# Patient Record
Sex: MALE | HISPANIC OR LATINO | Employment: FULL TIME | URBAN - METROPOLITAN AREA
[De-identification: names, ages, dates, MRNs, and addresses within clinical notes are randomized per-mention and may not be internally consistent; named-entity substitution may affect disease eponyms.]

---

## 2023-01-12 NOTE — TELEPHONE ENCOUNTER
RECORDS RECEIVED FROM: internal /ce   DATE RECEIVED: 2.20.23    NOTES (FOR ALL VISITS) STATUS DETAILS   OFFICE NOTES from referring provider internal  Self referred    OFFICE NOTES from other specialist ce - 11/8/22 Cusi   6.11.21 Lorenzoiestaiwo     MEDICATION LIST internal       LABS     DIABETES: HBGA1C, CREATININE, FASTING LIPIDS, MICROALBUMIN URINE, POTASSIUM, TSH, T4    THYROID: TSH, T4, CBC, THYRODLONULIN, TOTAL T3, FREE T4, CALCITONIN, CEA [facility]   CMP- 3.2.22  Lipid- 2.9.22  Cbc- 2.9.22  TSH- 2.9.22  Vitamin D-  2.9.22

## 2023-01-13 ENCOUNTER — HOSPITAL ENCOUNTER (OUTPATIENT)
Dept: GENERAL RADIOLOGY | Facility: CLINIC | Age: 38
Discharge: HOME | End: 2023-01-13
Attending: INTERNAL MEDICINE

## 2023-01-13 DIAGNOSIS — R76.11 MANTOUX: POSITIVE: ICD-10-CM

## 2023-01-13 PROCEDURE — 99207 XR CHEST 1 VIEW, EMPLOYEE HEALTH: CPT | Mod: GC | Performed by: RADIOLOGY

## 2023-01-13 PROCEDURE — 999N000028 XR CHEST 1 VIEW, EMPLOYEE HEALTH

## 2023-02-12 ENCOUNTER — HEALTH MAINTENANCE LETTER (OUTPATIENT)
Age: 38
End: 2023-02-12

## 2023-02-20 ENCOUNTER — PRE VISIT (OUTPATIENT)
Dept: ENDOCRINOLOGY | Facility: CLINIC | Age: 38
End: 2023-02-20

## 2023-02-21 ENCOUNTER — VIRTUAL VISIT (OUTPATIENT)
Dept: ENDOCRINOLOGY | Facility: CLINIC | Age: 38
End: 2023-02-21
Payer: COMMERCIAL

## 2023-02-21 DIAGNOSIS — E10.9 TYPE 1 DIABETES MELLITUS WITHOUT COMPLICATION (H): Primary | ICD-10-CM

## 2023-02-21 PROCEDURE — 99204 OFFICE O/P NEW MOD 45 MIN: CPT | Mod: VID | Performed by: STUDENT IN AN ORGANIZED HEALTH CARE EDUCATION/TRAINING PROGRAM

## 2023-02-21 RX ORDER — FOSINOPRIL SODIUM 20 MG/1
20 TABLET ORAL
COMMUNITY
Start: 2022-11-08 | End: 2023-05-25

## 2023-02-21 RX ORDER — ATORVASTATIN CALCIUM 10 MG/1
1 TABLET, FILM COATED ORAL
COMMUNITY
Start: 2022-11-08 | End: 2023-05-25

## 2023-02-21 RX ORDER — INSULIN LISPRO 100 [IU]/ML
INJECTION, SOLUTION INTRAVENOUS; SUBCUTANEOUS
COMMUNITY
Start: 2022-12-29

## 2023-02-21 NOTE — LETTER
2/21/2023       RE: Atul Walker  [id number]  [place] [address]     Dear Colleague,    Thank you for referring your patient, Atul Walker, to the [facility]OCRINOLOG[facility] [place] at [facility]. Please see a copy of my visit note below.    Endocrinology Clinic Visit 2/21/2023      Video-Visit Details    Type of service:  Video Visit    Joined the call at 2/21/2023, 4:11:31?pm.  Left the call at 2/21/2023, 4:43:05?pm.  You were on the call for 31 minutes 33 seconds .    Originating Location (pt. Location): Home        Distant Location (provider location):  Off-site    Mode of Communication:  Video Conference via EqsQuest    Physician has received verbal consent for a Video Visit from the patient? Yes    I spent a total of 45 minutes on the date of encounter reviewing medical records, evaluating the patient, coordinating care and documenting in the EHR, as detailed above.      NAME:  Atul Fernandez  PCP:  No primary care provider on file.  MRN:  [id number]  Reason for Consult:  DM1  Requesting Provider:  Referred Self    Chief Complaint     Chief Complaint   Patient presents with     Video Visit     Patient reports no question       History of Present Illness     Atul Fernandez is a 37 year old male who is seen in video visit for type 1 diabetes.    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 Extreme thirst and urination. Lost 30lbs in several months, non-healing mouth ulcers. -500s. A1c 13 at time of diagnosis. Positive antibodies per patient.    Has a Medtronic 770G pump. Rarely misses covering meals. Covers meals before eating. Still has BG spikes after meals, but usually not above 250.     Previous A1C in records reviewed. 7.2 on 2/9/22.    Diabetes Care/Complications:   Eyes: No  Last eye exam 1 year ago.  Kidneys: No, last urine  microalb negative  Nerves: No symptoms of neuropathy  Smoking: No  Blood Pressure: 120-30/70-80s at home.  Lipids: Yes, on statin  Macrovascular: No  Hypoglycemia: 1-2x/week. BG in 60s.    Previous DM related Hospitalization: No    Current treatment strategy:   Lantus 15u qPM if pump fails- has not had to use  Humalog via insulin pump    Blood Glucose Monitoring: we did not have his download on the time of the visit. He read the following:  Past 24h 83% in rage. Range is   17% above this range. No lows.      Diet:   Eats breakfast, lunch, and dinner.    Exercise: Walks    Social: No alcohol. Works as Neurologist. Started at [employer] first week of December.    Problem List     DM1    Medications     Current Outpatient Medications   Medication     atorvastatin (LIPITOR) 10 MG tablet     fosinopril (MONOPRIL) 20 MG tablet     HUMALOG 100 UNIT/ML injection     No current facility-administered medications for this visit.        Allergies     Allergies   Allergen Reactions     Nsaids Angioedema, Hives and Swelling     Eyes swelling       Shellfish Allergy Swelling       Medical / Surgical History     DM1    Social History     Social History     Socioeconomic History     Marital status:      Spouse name: Not on file     Number of children: Not on file     Years of education: Not on file     Highest education level: Not on file   Occupational History     Not on file   Tobacco Use     Smoking status: Never     Passive exposure: Never     Smokeless tobacco: Never   Substance and Sexual Activity     Alcohol use: Not on file     Drug use: Not on file     Sexual activity: Not on file   Other Topics Concern     Not on file   Social History Narrative     Not on file     Social Determinants of Health     Financial Resource Strain: Not on file   Food Insecurity: Not on file   Transportation Needs: Not on file   Physical Activity: Not on file   Stress: Not on file   Social Connections: Not on file   Intimate Partner  Violence: Not on file   Housing Stability: Not on file     Works as a Neurologist at the [employer]. Moved from [place] in 2022. Has 2 children, ages 5 and 7.  Smoking: No  Alcohol: No    Family History     No family history on file.   No family history of diabetes or autoimmune disease.  Dad's side: HTN, cardiac disease  Mom's side: Mom with hypothyroidism    ROS     12 ROS completed, pertinent positive and negative in HPI    Physical Exam   There were no vitals taken for this visit.   GENERAL: Healthy, alert and no distress  EYES: Eyes grossly normal to inspection.  No discharge or erythema, or obvious scleral/conjunctival abnormalities.  RESP: No audible wheeze, cough, or visible cyanosis.  No visible retractions or increased work of breathing.    SKIN: Visible skin clear. No significant rash, abnormal pigmentation or lesions.  NEURO: Cranial nerves grossly intact.  Mentation and speech appropriate for age.  PSYCH: Mentation appears normal, affect normal/bright, judgement and insight intact, normal speech and appearance well-groomed.     Labs/Imaging     Pertinent Labs were reviewed and discussed briefly.  Radiology Results were  reviewed.    Summary of recent findings:   3/2/22   Cr: 0.93    2/9/22  A1c: 7.2  Cholesterol: 144  Triglycerides: 34  LDL: 77  TSH: 1.6  Vit D: 19.55 (low)     I personally reviewed the patient's outside records from Care Everywhere. Summary of pertinent findings in [facility].    Impression / Plan     1. Latent autoimmune diabetes in adults (MATILDE)  36 y.o. year old male with type 1 diabetes mellitus, diagnosed in 2012. 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 He started to use a closed looped pump (Minimed Medtronic 670G) in August 2017. Now in 770G pump. No known current complications. A1c 7.2 in 2/2022.  Plan:   - Medtronic pump downloads were not available at time of visit. Will obtain these records.  - Labs: 25 Hydroxyvitamin D2 and D3,  Albumin, Cr, ALT, AST, CBC, A1c, Lipid profile, TTA IgA and IgG, TSH  - Referral to diabetes educator to discuss other pump options     2. Diabetes Complications: None     3. Blood Pressure Management: Blood pressure is 120-130s/70-80s at home. Currently is on pharmacotherapy for this.     4.Lipid Management: Per the new ACC/MAYRA/NHLBI guidelines, statins are recommended for individuals with diabetes aged 40-75 with LDL  without ASCVD, and for any individual with ASCVD. Currently the patient is on a statin.       5. Smoking Status: Patient is smoke free.    Review of the result(s) of each unique test - A1c and diabetes related labs.  45 minutes spent on the date of the encounter doing chart review, history and exam, documentation and further activities per the note       Test and/or medications prescribed today:  No orders of the defined types were placed in this encounter.        Follow up: 3 months with PA. 6 months with MD.    Nery Henry, MS4    I agree with the PFSH and ROS as completed by the Medical Student (MS).  The remainder of the encounter was performed by me and scribed by the MS.  The scribed note accurately reflects my personal services and the decisions made by me.    Juanpablo Rico MD  Endocrinology, Diabetes and Metabolism  [facility]      Juanpablo Rico MD  Endocrinology, Diabetes and Metabolism  [facility]    Answers for HPI/ROS submitted by the patient on 2/8/2023  General Symptoms: No  Skin Symptoms: No  HENT Symptoms: No  EYE SYMPTOMS: No  HEART SYMPTOMS: No  LUNG SYMPTOMS: No  INTESTINAL SYMPTOMS: No  URINARY SYMPTOMS: No  REPRODUCTIVE SYMPTOMS: No  SKELETAL SYMPTOMS: No  BLOOD SYMPTOMS: No  NERVOUS SYSTEM SYMPTOMS: No  MENTAL HEALTH SYMPTOMS: No        error      Again, thank you for allowing me to participate in the care of your patient.      Sincerely,    Juanpablo Rico MD

## 2023-02-21 NOTE — PROGRESS NOTES
Endocrinology Clinic Visit 2/21/2023      Video-Visit Details    Type of service:  Video Visit    Joined the call at 2/21/2023, 4:11:31?pm.  Left the call at 2/21/2023, 4:43:05?pm.  You were on the call for 31 minutes 33 seconds .    Originating Location (pt. Location): Home        Distant Location (provider location):  Off-site    Mode of Communication:  Video Conference via Deenty    Physician has received verbal consent for a Video Visit from the patient? Yes    I spent a total of 45 minutes on the date of encounter reviewing medical records, evaluating the patient, coordinating care and documenting in the EHR, as detailed above.      NAME:  Atul Fernandez  PCP:  No primary care provider on file.  MRN:  [id number]  Reason for Consult:  DM1  Requesting Provider:  Referred Self    Chief Complaint     Chief Complaint   Patient presents with     Video Visit     Patient reports no question       History of Present Illness     Atul Fernandez is a 37 year old male who is seen in video visit for type 1 diabetes.    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 Extreme thirst and urination. Lost 30lbs in several months, non-healing mouth ulcers. -500s. A1c 13 at time of diagnosis. Positive antibodies per patient.    Has a Medtronic 770G pump. Rarely misses covering meals. Covers meals before eating. Still has BG spikes after meals, but usually not above 250.     Previous A1C in records reviewed. 7.2 on 2/9/22.    Diabetes Care/Complications:   Eyes: No DR. Last eye exam 1 year ago.  Kidneys: No, last urine microalb negative  Nerves: No symptoms of neuropathy  Smoking: No  Blood Pressure: 120-30/70-80s at home.  Lipids: Yes, on statin  Macrovascular: No  Hypoglycemia: 1-2x/week. BG in 60s.    Previous DM related Hospitalization: No    Current treatment strategy:   Lantus 15u qPM if pump fails- has not had to use  Humalog via insulin pump    Blood Glucose  Monitoring: we did not have his download on the time of the visit. He read the following:  Past 24h 83% in rage. Range is   17% above this range. No lows.      Diet:   Eats breakfast, lunch, and dinner.    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    Problem List     DM1    Medications     Current Outpatient Medications   Medication     atorvastatin (LIPITOR) 10 MG tablet     fosinopril (MONOPRIL) 20 MG tablet     HUMALOG 100 UNIT/ML injection     No current facility-administered medications for this visit.        Allergies     Allergies   Allergen Reactions     Nsaids Angioedema, Hives and Swelling     Eyes swelling       Shellfish Allergy Swelling       Medical / Surgical History     DM1    Social History     Social History     Socioeconomic History     Marital status:      Spouse name: Not on file     Number of children: Not on file     Years of education: Not on file     Highest education level: Not on file   Occupational History     Not on file   Tobacco Use     Smoking status: Never     Passive exposure: Never     Smokeless tobacco: Never   Substance and Sexual Activity     Alcohol use: Not on file     Drug use: Not on file     Sexual activity: Not on file   Other Topics Concern     Not on file   Social History Narrative     Not on file     Social Determinants of Health     Financial Resource Strain: Not on file   Food Insecurity: Not on file   Transportation Needs: Not on file   Physical Activity: Not on file   Stress: Not on file   Social Connections: Not on file   Intimate Partner Violence: Not on file   Housing Stability: Not on file     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 Moved from [place] in 2022. Has 2 children, ages 5 and 7.  Smoking: No  Alcohol: No    Family History     No family history on file.   No family history of diabetes or autoimmune disease.  Dad's side: HTN, cardiac disease  Mom's side: Mom with  hypothyroidism    ROS     12 ROS completed, pertinent positive and negative in HPI    Physical Exam   There were no vitals taken for this visit.   GENERAL: Healthy, alert and no distress  EYES: Eyes grossly normal to inspection.  No discharge or erythema, or obvious scleral/conjunctival abnormalities.  RESP: No audible wheeze, cough, or visible cyanosis.  No visible retractions or increased work of breathing.    SKIN: Visible skin clear. No significant rash, abnormal pigmentation or lesions.  NEURO: Cranial nerves grossly intact.  Mentation and speech appropriate for age.  PSYCH: Mentation appears normal, affect normal/bright, judgement and insight intact, normal speech and appearance well-groomed.     Labs/Imaging     Pertinent Labs were reviewed and discussed briefly.  Radiology Results were  reviewed.    Summary of recent findings:   3/2/22   Cr: 0.93    2/9/22  A1c: 7.2  Cholesterol: 144  Triglycerides: 34  LDL: 77  TSH: 1.6  Vit D: 19.55 (low)     I personally reviewed the patient's outside records from Care Everywhere. Summary of pertinent findings in HPI.    Impression / Plan     1. Latent autoimmune diabetes in adults (MATILDE)  36 y.o. year old male with type 1 diabetes mellitus, diagnosed in 2012. 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 He started to use a closed looped pump (Minimed Medtronic 670G) in August 2017. Now in 770G pump. No known current complications. A1c 7.2 in 2/2022.  Plan:   - Medtronic pump downloads were not available at time of visit. Will obtain these records.  - Labs: 25 Hydroxyvitamin D2 and D3, Albumin, Cr, ALT, AST, CBC, A1c, Lipid profile, TTA IgA and IgG, TSH  - Referral to diabetes educator to discuss other pump options     2. Diabetes Complications: None     3. Blood Pressure Management: Blood pressure is 120-130s/70-80s at home. Currently is on pharmacotherapy for this.     4.Lipid Management: Per the new ACC/MAYRA/NHLBI guidelines,  statins are recommended for individuals with diabetes aged 40-75 with LDL  without ASCVD, and for any individual with ASCVD. Currently the patient is on a statin.       5. Smoking Status: Patient is smoke free.    Review of the result(s) of each unique test - A1c and diabetes related labs.  45 minutes spent on the date of the encounter doing chart review, history and exam, documentation and further activities per the note       Test and/or medications prescribed today:  No orders of the defined types were placed in this encounter.        Follow up: 3 months with PA. 6 months with MD. Nery Henry, MS4    I agree with the PFSH and ROS as completed by the Medical Student (MS).  The remainder of the encounter was performed by me and scribed by the MS.  The scribed note accurately reflects my personal services and the decisions made by me.    Juanpablo Rico MD  Endocrinology, Diabetes and Metabolism  [facility]      Juanpablo Rico MD  Endocrinology, Diabetes and Metabolism  [facility]    Answers for HPI/ROS submitted by the patient on 2/8/2023  General Symptoms: No  Skin Symptoms: No  HENT Symptoms: No  EYE SYMPTOMS: No  HEART SYMPTOMS: No  LUNG SYMPTOMS: No  INTESTINAL SYMPTOMS: No  URINARY SYMPTOMS: No  REPRODUCTIVE SYMPTOMS: No  SKELETAL SYMPTOMS: No  BLOOD SYMPTOMS: No  NERVOUS SYSTEM SYMPTOMS: No  MENTAL HEALTH SYMPTOMS: No

## 2023-02-21 NOTE — NURSING NOTE
Is the patient currently in the state of [place]? YES    Visit mode:VIDEO    If the visit is dropped, the patient can be reconnected by: VIDEO VISIT: Text to cell phone: [phone]    Will anyone else be joining the visit? NO      How would you like to obtain your AVS? Mail a copy    Are changes needed to the allergy or medication list? YES: Medications and allergies abstracted through patient reconciliation per patient request. Dosages verified with patient. Patient also notes using CGM with medtronic insulin pump.    Reason for visit: New Patient

## 2023-02-21 NOTE — LETTER
Date:February 24, 2023      Patient was self referred, no letter generated. Do not send.        [facility] Health Information

## 2023-02-27 DIAGNOSIS — E10.9 TYPE 1 DIABETES MELLITUS WITHOUT COMPLICATION (H): Primary | ICD-10-CM

## 2023-03-02 RX ORDER — INSULIN ASPART 100 [IU]/ML
INJECTION, SOLUTION INTRAVENOUS; SUBCUTANEOUS
Qty: 70 ML | Refills: 3 | Status: SHIPPED | OUTPATIENT
Start: 2023-03-02 | End: 2024-07-03

## 2023-03-08 ENCOUNTER — TELEPHONE (OUTPATIENT)
Dept: ENDOCRINOLOGY | Facility: CLINIC | Age: 38
End: 2023-03-08
Payer: COMMERCIAL

## 2023-03-08 NOTE — TELEPHONE ENCOUNTER
VIVIANA and sent MyChart for pt to c/back to schedule a 3 month f/up with a PA and a 6 month f/up with Dr. Rico per the checkout orders from visit on 2/21/2023.     Kelsey Love on 3/8/2023 at 4:01 PM

## 2023-03-11 NOTE — TELEPHONE ENCOUNTER
VIVIANA and sent [facility] for a 3 month follow up with a PA and a 6 month follow up with Dr. Rico.  Afua Altman, CMA

## 2023-03-15 ENCOUNTER — VIRTUAL VISIT (OUTPATIENT)
Dept: EDUCATION SERVICES | Facility: CLINIC | Age: 38
End: 2023-03-15
Attending: STUDENT IN AN ORGANIZED HEALTH CARE EDUCATION/TRAINING PROGRAM
Payer: COMMERCIAL

## 2023-03-15 DIAGNOSIS — E10.9 TYPE 1 DIABETES MELLITUS WITHOUT COMPLICATION (H): ICD-10-CM

## 2023-03-15 PROCEDURE — G0108 DIAB MANAGE TRN  PER INDIV: HCPCS | Mod: VID

## 2023-03-15 RX ORDER — INSULIN PMP CART,AUT,G6/7,CNTR
1 EACH SUBCUTANEOUS
Qty: 1 KIT | Refills: 0 | Status: SHIPPED | OUTPATIENT
Start: 2023-03-15

## 2023-03-15 RX ORDER — PROCHLORPERAZINE 25 MG/1
SUPPOSITORY RECTAL
Qty: 1 EACH | Refills: 1 | Status: SHIPPED | OUTPATIENT
Start: 2023-03-15 | End: 2023-11-27

## 2023-03-15 RX ORDER — PROCHLORPERAZINE 25 MG/1
SUPPOSITORY RECTAL
Qty: 3 EACH | Refills: 5 | Status: SHIPPED | OUTPATIENT
Start: 2023-03-15 | End: 2023-10-25

## 2023-03-15 RX ORDER — INSULIN PMP CART,AUT,G6/7,CNTR
1 EACH SUBCUTANEOUS
Qty: 15 EACH | Refills: 11 | Status: SHIPPED | OUTPATIENT
Start: 2023-03-15 | End: 2023-10-25

## 2023-03-15 NOTE — PROGRESS NOTES
Video-Visit Details    Type of service:  Video Visit  Patient consented to video visit  Video Start Time:  8a  Video End Time:   9a  Originating Location (pt. 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     PATIENT CONCERNS RELATED TO DIABETES SELF MANAGEMENT:  Estb care and discuss pumps      ASSESSMENT:    Taking Medication:     Current Diabetes Management per Patient:  Medtronic 770 G Pump Humalog      Monitoring    Patient glucose self monitoring as follows:   [employer]             Patient's most recent No results found for: A1C   Patient's A1C goal: <7.0    Activity: sporadic or irregular exercise usually exercises just moved from [place]    Healthy Eating:   Patient currently eats 3 meals 0 snacks per day     Breakfast  615-630a 2 toasts turkey and cheese and coffee  Lunch   Rice and beans and protein and salad water  Dinner 7-8pm Similar to lunch excepts Friday pizza water    Occ snacks on protein bar  No alcohol     Problem Solving:      Patient is at risk of hypoglycemia?: YES and Frequency is one to two times per week  Hospitalizations for hyper or hypoglycemia: No    Healthy Coping and Stress Management:   Sources of stress identified by patient My Job and the  of neurology   Coping mechanisms identified by patient:  Other (please specify):  relaxtion with 2 girls         EDUCATION and INSTRUCTION PROVIDED AT THIS VISIT:     Pt had a 1x1 visit to discuss insulin pumps. 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 Pt was dx with  Type 1 DM in 2012. He is currently on Medtronic 770G pump.  His time in range is currently 73% and 1% lows and he is in automode 95% of the time. Pt is getting blood sugars spike most days after breakfast. Today we added a I:C ratio of 1 unit per 13 grams of carb from 6a-8a.  We reviewed Omnipod 5 and Tandem with control IQ the advantages and disadvantages. Pt does get frustrated with all the calibrations with the Medtronic pump. He is interested in trying the Dexcom G6 with the Omnipod 5.  RX's sent to [facility].  Pt will contact CDE if we plans to proceed with pump based on cost he does have about 9mo of Medtronic supplies. Pt aware he will need a pump start appt with SHELBY or Omnipod traininer.    Patient-stated goal written and given to Atul Walker.  Verbalized and demonstrated understanding of instructions.     PLAN:    RX's sent to [facility] for Dexcom G6 and Omnipod 5 pump    Contact CLARE Zhang/SHELBY if you decide to proceed with starting Dexcom and pump so we can get a pump start appt with me or Omnipod     Start Dexcom on own (youtube videos are great). Start it prior to your pump start appt    Reach out with any questions    Leatha WILSON, RN, [facility]  Certified Diabetes Care and   [facility]  [address] [place] [address]  Phone [phone]      Time spent with patient at today's visit was 60 minutes.      Any diabetes medication dose changes were made via the SHELBY Protocol and Collaborative Practice Agreement with [place] and  [facility].  A copy of this encounter was provided to patient's referring provider.

## 2023-05-03 ENCOUNTER — LAB (OUTPATIENT)
Dept: LAB | Facility: CLINIC | Age: 38
End: 2023-05-03
Payer: COMMERCIAL

## 2023-05-03 DIAGNOSIS — E10.9 TYPE 1 DIABETES MELLITUS WITHOUT COMPLICATION (H): ICD-10-CM

## 2023-05-03 LAB
ALT SERPL W P-5'-P-CCNC: 65 U/L (ref 10–50)
AST SERPL W P-5'-P-CCNC: 28 U/L (ref 10–50)
CHOLEST SERPL-MCNC: 150 MG/DL
CREAT SERPL-MCNC: 0.96 MG/DL (ref 0.67–1.17)
CREAT UR-MCNC: 161 MG/DL
ERYTHROCYTE [DISTWIDTH] IN BLOOD BY AUTOMATED COUNT: 12.8 % (ref 10–15)
FASTING STATUS PATIENT QL REPORTED: YES
GFR SERPL CREATININE-BSD FRML MDRD: >90 ML/MIN/1.73M2
GLUCOSE SERPL-MCNC: 126 MG/DL (ref 70–99)
HBA1C MFR BLD: 7.7 % (ref 0–5.6)
HCT VFR BLD AUTO: 45.7 % (ref 40–53)
HDLC SERPL-MCNC: 57 MG/DL
HGB BLD-MCNC: 15.2 G/DL (ref 13.3–17.7)
LDLC SERPL CALC-MCNC: 85 MG/DL
MCH RBC QN AUTO: 28.8 PG (ref 26.5–33)
MCHC RBC AUTO-ENTMCNC: 33.3 G/DL (ref 31.5–36.5)
MCV RBC AUTO: 87 FL (ref 78–100)
MICROALBUMIN UR-MCNC: <12 MG/L
MICROALBUMIN/CREAT UR: NORMAL MG/G{CREAT}
NONHDLC SERPL-MCNC: 93 MG/DL
PLATELET # BLD AUTO: 256 10E3/UL (ref 150–450)
RBC # BLD AUTO: 5.27 10E6/UL (ref 4.4–5.9)
TRIGL SERPL-MCNC: 40 MG/DL
TSH SERPL DL<=0.005 MIU/L-ACNC: 2.35 UIU/ML (ref 0.3–4.2)
WBC # BLD AUTO: 6.7 10E3/UL (ref 4–11)

## 2023-05-03 PROCEDURE — 82947 ASSAY GLUCOSE BLOOD QUANT: CPT

## 2023-05-03 PROCEDURE — 84460 ALANINE AMINO (ALT) (SGPT): CPT

## 2023-05-03 PROCEDURE — 86364 TISS TRNSGLTMNASE EA IG CLAS: CPT

## 2023-05-03 PROCEDURE — 83036 HEMOGLOBIN GLYCOSYLATED A1C: CPT

## 2023-05-03 PROCEDURE — 85027 COMPLETE CBC AUTOMATED: CPT

## 2023-05-03 PROCEDURE — 80061 LIPID PANEL: CPT

## 2023-05-03 PROCEDURE — 82043 UR ALBUMIN QUANTITATIVE: CPT

## 2023-05-03 PROCEDURE — 82570 ASSAY OF URINE CREATININE: CPT

## 2023-05-03 PROCEDURE — 82306 VITAMIN D 25 HYDROXY: CPT

## 2023-05-03 PROCEDURE — 84443 ASSAY THYROID STIM HORMONE: CPT

## 2023-05-03 PROCEDURE — 36415 COLL VENOUS BLD VENIPUNCTURE: CPT

## 2023-05-03 PROCEDURE — 82565 ASSAY OF CREATININE: CPT

## 2023-05-03 PROCEDURE — 84450 TRANSFERASE (AST) (SGOT): CPT

## 2023-05-04 LAB
TTG IGA SER-ACNC: 0.8 U/ML
TTG IGG SER-ACNC: <0.6 U/ML

## 2023-05-07 LAB
DEPRECATED CALCIDIOL+CALCIFEROL SERPL-MC: <24 UG/L (ref 20–75)
VITAMIN D2 SERPL-MCNC: <5 UG/L
VITAMIN D3 SERPL-MCNC: 19 UG/L

## 2023-05-25 ENCOUNTER — TELEPHONE (OUTPATIENT)
Dept: ENDOCRINOLOGY | Facility: CLINIC | Age: 38
End: 2023-05-25
Payer: COMMERCIAL

## 2023-05-25 ENCOUNTER — MYC REFILL (OUTPATIENT)
Dept: EDUCATION SERVICES | Facility: CLINIC | Age: 38
End: 2023-05-25
Payer: COMMERCIAL

## 2023-05-25 DIAGNOSIS — E10.9 TYPE 1 DIABETES MELLITUS WITHOUT COMPLICATION (H): Primary | ICD-10-CM

## 2023-05-25 RX ORDER — FOSINOPRIL SODIUM 20 MG/1
20 TABLET ORAL DAILY
Qty: 90 TABLET | Refills: 3 | Status: SHIPPED | OUTPATIENT
Start: 2023-05-25 | End: 2024-05-31

## 2023-05-25 RX ORDER — ATORVASTATIN CALCIUM 10 MG/1
10 TABLET, FILM COATED ORAL
Qty: 90 TABLET | Refills: 3 | Status: SHIPPED | OUTPATIENT
Start: 2023-05-25 | End: 2024-05-31

## 2023-05-25 NOTE — TELEPHONE ENCOUNTER
Called pt to schedule appt they thought was scheduled for July, but there is no record of it being scheduled  Yovany Nuno on 5/25/2023 at 9:03 AM

## 2023-08-13 ENCOUNTER — HEALTH MAINTENANCE LETTER (OUTPATIENT)
Age: 38
End: 2023-08-13

## 2023-08-21 ENCOUNTER — MEDICAL CORRESPONDENCE (OUTPATIENT)
Dept: HEALTH INFORMATION MANAGEMENT | Facility: CLINIC | Age: 38
End: 2023-08-21

## 2023-08-22 ENCOUNTER — TELEPHONE (OUTPATIENT)
Dept: ENDOCRINOLOGY | Facility: CLINIC | Age: 38
End: 2023-08-22
Payer: COMMERCIAL

## 2023-08-22 NOTE — TELEPHONE ENCOUNTER
Prior Authorization Retail Medication Request    Medication/Dose: Omnipod 5 G6 1 every 72 hours     ICD code (if different than what is on RX):  E10.9    Previously Tried and Failed:    Rationale:  Type 1 diabetes mellitus without complication     Insurance Name:    Insurance ID:        Pharmacy Information (if different than what is on RX)  Name:    Phone:

## 2023-08-25 NOTE — TELEPHONE ENCOUNTER
PA Initiation    Medication: OMNIPOD 5 G6 POD (GEN 5) MISC  Insurance Company: [employer] - Phone [phone] Fax [phone]  Pharmacy Filling the Rx: [id number] - [place], [place] - [address]  Filling Pharmacy Phone: [phone]  Filling Pharmacy Fax: [phone]  Start Date: 8/24/2023

## 2023-08-25 NOTE — TELEPHONE ENCOUNTER
Prior Authorization Approval    Authorization Effective Date: 8/25/2023  Authorization Expiration Date: 8/25/2024  Medication: Insulin Disposable Pump (OMNIPOD 5 G6 POD, GEN 5,) MISC  Approved Dose/Quantity:   Reference #:     Insurance Company: mention - Phone [phone] Fax [phone]  Expected CoPay:       CoPay Card Available:      Foundation Assistance Needed:    Which Pharmacy is filling the prescription (Not needed for infusion/clinic administered): [id number] - [place], [place] - [address]  Pharmacy Notified: Yes  Patient Notified: Yes

## 2023-09-18 NOTE — PROGRESS NOTES
Outcome for 09/18/23 4:19 PM: Data uploaded on Carelink  Bailee Bradshaw LPN   Outcome for 09/22/23 11:20 AM: frestylt message sent- pt has switched to dexcom and omnipod  Tova Gonzalez MA  Outcome for 09/22/23 11:23 AM: Left Voicemail   Tova Gonzalez MA  Outcome for 09/25/23 2:34 PM: Data obtained via Dexcom and Glooko website  Bailee Bradshaw LPN

## 2023-09-20 ENCOUNTER — VIRTUAL VISIT (OUTPATIENT)
Dept: EDUCATION SERVICES | Facility: CLINIC | Age: 38
End: 2023-09-20
Payer: COMMERCIAL

## 2023-09-20 VITALS — WEIGHT: 165.34 LBS

## 2023-09-20 DIAGNOSIS — E10.9 TYPE 1 DIABETES MELLITUS WITHOUT COMPLICATION (H): Primary | ICD-10-CM

## 2023-09-20 PROCEDURE — 99207 PR NO BILLABLE SERVICE THIS VISIT: CPT | Mod: VID

## 2023-09-20 NOTE — PROGRESS NOTES
Virtual Visit Details    Type of service:  Video Visit-converted to phone    Originating Location (pt. Location): Home    Distant Location (provider location):  On-site  Platform used for Video Visit: [facility]    Diabetes Type 1:  Atul Walker has been using a Medtronic 770 pump and transitioned to OP5 insulin pump. He self started 9/9. He does use a continuous glucose monitor (CGM).    Atul Walker uses Novolog in his insulin pump. Unfortunately, I am unable to view pump reports. Based on Dexcom ICR likely needs strengthening. He is going to connect with Joyce Magallanes to troubleshoot linking data and will let me know when completed. Visit to be rescheduled once data is available.    Time spent in visit: 18 minutes-NO CHARGE    Steffany Quinones RN, Diabetes Educator  Diabetes Education Department  [facility], [place]  [phone]

## 2023-09-22 ENCOUNTER — TELEPHONE (OUTPATIENT)
Dept: ENDOCRINOLOGY | Facility: CLINIC | Age: 38
End: 2023-09-22

## 2023-09-22 ENCOUNTER — VIRTUAL VISIT (OUTPATIENT)
Dept: EDUCATION SERVICES | Facility: CLINIC | Age: 38
End: 2023-09-22
Payer: COMMERCIAL

## 2023-09-22 DIAGNOSIS — E10.9 TYPE 1 DIABETES MELLITUS WITHOUT COMPLICATION (H): Primary | ICD-10-CM

## 2023-09-22 PROCEDURE — 98967 PH1 ASSMT&MGMT NQHP 11-20: CPT | Mod: 93

## 2023-09-22 RX ORDER — URINE ACETONE TEST STRIPS
STRIP MISCELLANEOUS
Qty: 50 STRIP | Refills: 1 | Status: SHIPPED | OUTPATIENT
Start: 2023-09-22

## 2023-09-22 ASSESSMENT — PAIN SCALES - GENERAL: PAINLEVEL: NO PAIN (0)

## 2023-09-22 NOTE — PATIENT INSTRUCTIONS
PLAN:  Change carb ratio at 6am to 12  Change carb ratio at 9am to 13  Change active insulin time to 3 hours  Change correct above from 140 to 130  Follow up 9/26 with Dr. Rico

## 2023-09-22 NOTE — TELEPHONE ENCOUNTER
Called patient and left voicemail. Patient has an appointment on  9/26/23 . Need patient to upload their Dexcom and omnipod devices to site for provider to review prior to their appointment.  Tova Gonzalez MA

## 2023-09-22 NOTE — PROGRESS NOTES
Diabetes Self-Management Education & Support Telephone Visit  Diabetes Type 1:  Atul Walker has been using an OP5 insulin pump since . He self started from Medtronic to 770. He does use a continuous glucose monitor (CGM).    Atul Walker uses Novolog in his insulin pump.    Other diabetes medications patient is taking include: Yes.  Patient is not taking a daily asprin.  Patient is taking a statin.  Patient is taking an ACE/ARB.    Patient reports has reported hypoglycemic episodes.  Hypoglycemic symptoms consist of jittery.  Patient will usually treat lows with glucose tablets.  Patient does have Baqsimi available at home and it is not .  Family has been instructed on how to use in a hypoglycemic emergency.    Patient does not report recent hyperglycemia symptoms, including: sluggish near 300. Patient does follow insulin pump high blood sugar protocol and does not have ketone testing strips available    Patient is up to date with their yearly eye exam.  Patient is up to date with their yearly foot exam.    Insulin pump download reveals:  Patient is changing infusion set an average of every 3 days.  Patient has an average of 5.1 manual boluses per day and 0 overrides per day.  Average suspend duration is 0 minutes/day.  Patient's average blood sugar is 173 +/- 60 mg/dL  Percentage of readings above target: 29%  Percentage of readings below target: 0%  Average daily carb intake: 229.6   Patient's TDD is 50.3 units.  Patient's basal/bolus ratio is 70%/30%, 35.2/15.1 units    Discussion of behaviors related to insulin pump use reveals:Pre-meal boluses, calibrates with steady arrow    Assessment:  Transitioned from Medtronic 770 to OP5 on . Overall, feels things are going well. Pattern of prolonged post prandial highs. Will strengthen ICR at 6am to 12 and 9am to 13. Change active insulin time to 3 hours and correct above to 130.    PLAN:  Change carb ratio at 6am to 12  Change carb  ratio at 9am to 13  Change active insulin time to 3 hours  Change correct above from 140 to 130  Follow up 9/26 with Dr. Rico    See scanned in pump report(s) data below:        Time spent in visit: 18 minutes-NO CHARGE.    Steffany Quinones RN, Diabetes Educator  Diabetes Education Department  [facility], [place]  [phone]

## 2023-09-22 NOTE — NURSING NOTE
Is the patient currently in the state of [place]? YES    Visit mode:VIDEO    If the visit is dropped, the patient can be reconnected by: VIDEO VISIT: Send to e-mail at: [email or web address]    Will anyone else be joining the visit? NO  (If patient encounters technical issues they should call [phone])    How would you like to obtain your AVS? MyChart    Are changes needed to the allergy or medication list? No  Please remove any meds marked not taking and any flagged for removal.    Reason for visit: No chief complaint on file.    Wt/ht other than 24 hrs:  none given  Pain more than one location:  no  Xiao STRICKLANDF

## 2023-09-26 ENCOUNTER — VIRTUAL VISIT (OUTPATIENT)
Dept: ENDOCRINOLOGY | Facility: CLINIC | Age: 38
End: 2023-09-26
Payer: COMMERCIAL

## 2023-09-26 DIAGNOSIS — E10.9 TYPE 1 DIABETES MELLITUS WITHOUT COMPLICATION (H): Primary | ICD-10-CM

## 2023-09-26 PROBLEM — E11.9 DIABETES MELLITUS, TYPE 2 (H): Status: ACTIVE | Noted: 2023-09-26

## 2023-09-26 PROCEDURE — 99215 OFFICE O/P EST HI 40 MIN: CPT | Mod: VID | Performed by: STUDENT IN AN ORGANIZED HEALTH CARE EDUCATION/TRAINING PROGRAM

## 2023-09-26 NOTE — NURSING NOTE
Is the patient currently in the state of [place]? YES    Visit mode:VIDEO    If the visit is dropped, the patient can be reconnected by: VIDEO VISIT: Text to cell phone:   Telephone Information:   Mobile [phone]       Will anyone else be joining the visit? NO  (If patient encounters technical issues they should call [phone])    How would you like to obtain your AVS? MyChart    Are changes needed to the allergy or medication list? Pt stated no changes to allergies and Pt stated no med changes    Reason for visit: IESHA Gonzalez, SHIRLEY, VVF

## 2023-09-26 NOTE — LETTER
9/26/2023       RE: Atul Walker  [address] [address]     Dear Colleague,    Thank you for referring your patient, Atul Walker, to the [facility] ENDOCRINOLOG[facility] [place] at [facility]. Please see a copy of my visit note below.    Outcome for 09/18/23 4:19 PM: Data uploaded on [employer] [facility]  Bailee Bradshaw LPN   Outcome for 09/22/23 11:20 AM: DWNLDt message sent- pt has switched to dexcom and omnipod  Tova Gonzalez MA  Outcome for 09/22/23 11:23 AM: Left Voicemail   Tova Gonzalez MA  Outcome for 09/25/23 2:34 PM: Data obtained via Dexcom and [employer] website  Bailee Bradshaw LPN                         Endocrinology Clinic Visit       Video-Visit Details    Type of service:  Video Visit    Joined the call at 9/26/2023, 1:37:04 pm.  Left the call at 9/26/2023, 2:00:46 pm.    Originating Location (pt. Location): Home        Distant Location (provider location):  Off-site    Mode of Communication:  Video Conference via [facility]    Physician has received verbal consent for a Video Visit from the patient? Yes    I spent a total of 40 minutes on the date of encounter reviewing medical records, evaluating the patient, coordinating care and documenting in the EHR, as detailed above.      NAME:  Atul Leeroy isadora Menchaca  PCP:  No primary care provider on file.  MRN:  [id number]  Reason for Consult:  DM1  Requesting Provider:  Referred Self    Chief Complaint     Chief Complaint   Patient presents with    RECHECK       History of Present Illness     Atul Leeroy Brewerda is a 37 year old male who is seen in video visit for type 1 diabetes. He established with me on 2/2023.     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 A1c 13 at time of diagnosis. Positive antibodies per patient. 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 He started to use a closed looped pump  (Minimed Medtronic 670G) in August 2017. then 770G pump.     Last visit he had a Medtronic 770G pump. Rarely misses covering meals. Covers meals before eating. Still has BG spikes after meals. He met with keisha Steve on 3/2023.    Interval hx: he switched to omnipod 2 weeks ago. He is happy with his new pump. He saw rl last week and his ICR was slightly increase at lunch and dinner from 1:14 to 1:13.    Previous A1C in records reviewed. Last A1c on 5/2023 was 7.7    Diabetes Care/Complications:   Eyes: No DR. Last eye exam 1 year ago.  Kidneys: No, last urine microalb negative on 5/2023  Nerves: No symptoms of neuropathy  Smoking: No  Blood Pressure: 120-30/70-80s at home.  Lipids: Yes, on statin, last LDL 85 on 5/2023  Macrovascular: No  Hypoglycemia:no concern    Previous DM related Hospitalization: No    Current treatment strategy:   Lantus 15u qPM if pump fails- has not had to use  Novolog via insulin pump    Blood Glucose Monitoring:                             Diet:   Eats breakfast, lunch, and dinner.    Exercise: Walks    Social: No alcohol. 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    Problem List     DM1    Medications     Current Outpatient Medications   Medication    atorvastatin (LIPITOR) 10 MG tablet    Continuous Blood Gluc Sensor (DEXCOM G6 SENSOR) MISC    Continuous Blood Gluc Transmit (DEXCOM G6 TRANSMITTER) MISC    fosinopril (MONOPRIL) 20 MG tablet    Glucagon (BAQSIMI) 3 MG/DOSE nasal powder    HUMALOG 100 UNIT/ML injection    insulin aspart (NOVOLOG VIAL) 100 UNITS/ML vial    Insulin Disposable Pump (OMNIPOD 5 G6 INTRO, GEN 5,) KIT    Insulin Disposable Pump (OMNIPOD 5 G6 POD, GEN 5,) MISC    KETOSTIX test strip     No current facility-administered medications for this visit.        Allergies     Allergies   Allergen Reactions    Nsaids Angioedema, Hives and Swelling     Eyes swelling      Shellfish Allergy Swelling       Medical / Surgical History     DM1    Social  History     Social History     Socioeconomic History    Marital status:      Spouse name: Not on file    Number of children: Not on file    Years of education: Not on file    Highest education level: Not on file   Occupational History    Not on file   Tobacco Use    Smoking status: Never     Passive exposure: Never    Smokeless tobacco: Never   Vaping Use    Vaping Use: Never used   Substance and Sexual Activity    Alcohol use: Not on file    Drug use: Not on file    Sexual activity: Not on file   Other Topics Concern    Not on file   Social History Narrative    Not on file     Social Determinants of Health     Financial Resource Strain: Not on file   Food Insecurity: Not on file   Transportation Needs: Not on file   Physical Activity: Not on file   Stress: Not on file   Social Connections: Not on file   Interpersonal Safety: Not on file   Housing Stability: Not on file     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 Moved from [place] in 2022. Has 2 children, ages 5 and 7.  Smoking: No  Alcohol: No    Family History     No family history on file.   No family history of diabetes or autoimmune disease.  Dad's side: HTN, cardiac disease  Mom's side: Mom with hypothyroidism    ROS     12 ROS completed, pertinent positive and negative in HPI    Physical Exam   There were no vitals taken for this visit.   GENERAL: Healthy, alert and no distress  EYES: Eyes grossly normal to inspection.  No discharge or erythema, or obvious scleral/conjunctival abnormalities.  RESP: No audible wheeze, cough, or visible cyanosis.  No visible retractions or increased work of breathing.    SKIN: Visible skin clear. No significant rash, abnormal pigmentation or lesions.  NEURO: Cranial nerves grossly intact.  Mentation and speech appropriate for age.  PSYCH: Mentation appears normal, affect normal/bright, judgement and insight intact, normal speech and appearance well-groomed.     Labs/Imaging     Pertinent Labs were reviewed and  discussed briefly.  Radiology Results were  reviewed.    Summary of recent findings:   3/2/22   Cr: 0.93    2/9/22  A1c: 7.2  Cholesterol: 144  Triglycerides: 34  LDL: 77  TSH: 1.6  Vit D: 19.55 (low)     I personally reviewed the patient's outside records from Care Everywhere. Summary of pertinent findings in HPI.    Impression / Plan     1. 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 We discussed increasing his ICR slightly at 9am-12am from 1:13 to 1:12.     2. Diabetes Complications: None. Due for eye exam. Referral placed.     3. Blood Pressure Management: Blood pressure is 120-130s/70-80s at home. Currently is on pharmacotherapy for this.     4.Lipid Management: Per the new ACC/MAYRA/NHLBI guidelines, statins are recommended for individuals with diabetes aged 40-75 with LDL  without ASCVD, and for any individual with ASCVD. Currently the patient is on a statin.       5. Smoking Status: Patient is smoke free.    Review of the result(s) of each unique test - A1c and diabetes related labs.         Test and/or medications prescribed today:  No orders of the defined types were placed in this encounter.        Follow up: 3 months with PA. 6 months with MD.       Juanpablo Rico MD  Endocrinology, Diabetes and Metabolism  [facility]

## 2023-09-26 NOTE — PROGRESS NOTES
Endocrinology Clinic Visit       Video-Visit Details    Type of service:  Video Visit    Joined the call at 9/26/2023, 1:37:04 pm.  Left the call at 9/26/2023, 2:00:46 pm.    Originating Location (pt. Location): Home        Distant Location (provider location):  Off-site    Mode of Communication:  Video Conference via [employer]    Physician has received verbal consent for a Video Visit from the patient? Yes    I spent a total of 40 minutes on the date of encounter reviewing medical records, evaluating the patient, coordinating care and documenting in the EHR, as detailed above.      NAME:  Atul Gormano isadora Menchaca  PCP:  No primary care provider on file.  MRN:  [id number]  Reason for Consult:  DM1  Requesting Provider:  Referred Self    Chief Complaint     Chief Complaint   Patient presents with    RECHECK       History of Present Illness     Atul Fernandez is a 37 year old male who is seen in video visit for type 1 diabetes. He established with me on 2/2023.     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 A1c 13 at time of diagnosis. Positive antibodies per patient. 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 He started to use a closed looped pump (Minimed Medtronic 670G) in August 2017. then 770G pump.     Last visit he had a Medtronic 770G pump. Rarely misses covering meals. Covers meals before eating. Still has BG spikes after meals. He met with keisha Steve on 3/2023.    Interval hx: he switched to omnipod 2 weeks ago. He is happy with his new pump. He saw rl last week and his ICR was slightly increase at lunch and dinner from 1:14 to 1:13.    Previous A1C in records reviewed. Last A1c on 5/2023 was 7.7    Diabetes Care/Complications:   Eyes: No DRMandy Last eye exam 1 year ago.  Kidneys: No, last urine microalb negative on 5/2023  Nerves: No symptoms of neuropathy  Smoking: No  Blood Pressure: 120-30/70-80s at home.  Lipids: Yes, on statin,  last LDL 85 on 5/2023  Macrovascular: No  Hypoglycemia:no concern    Previous DM related Hospitalization: No    Current treatment strategy:   Lantus 15u qPM if pump fails- has not had to use  Novolog via insulin pump    Blood Glucose Monitoring:                             Diet:   Eats breakfast, lunch, and dinner.    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    Problem List     DM1    Medications     Current Outpatient Medications   Medication    atorvastatin (LIPITOR) 10 MG tablet    Continuous Blood Gluc Sensor (DEXCOM G6 SENSOR) MISC    Continuous Blood Gluc Transmit (DEXCOM G6 TRANSMITTER) MISC    fosinopril (MONOPRIL) 20 MG tablet    Glucagon (BAQSIMI) 3 MG/DOSE nasal powder    HUMALOG 100 UNIT/ML injection    insulin aspart (NOVOLOG VIAL) 100 UNITS/ML vial    Insulin Disposable Pump (OMNIPOD 5 G6 INTRO, GEN 5,) KIT    Insulin Disposable Pump (OMNIPOD 5 G6 POD, GEN 5,) MISC    KETOSTIX test strip     No current facility-administered medications for this visit.        Allergies     Allergies   Allergen Reactions    Nsaids Angioedema, Hives and Swelling     Eyes swelling      Shellfish Allergy Swelling       Medical / Surgical History     DM1    Social History     Social History     Socioeconomic History    Marital status:      Spouse name: Not on file    Number of children: Not on file    Years of education: Not on file    Highest education level: Not on file   Occupational History    Not on file   Tobacco Use    Smoking status: Never     Passive exposure: Never    Smokeless tobacco: Never   Vaping Use    Vaping Use: Never used   Substance and Sexual Activity    Alcohol use: Not on file    Drug use: Not on file    Sexual activity: Not on file   Other Topics Concern    Not on file   Social History Narrative    Not on file     Social Determinants of Health     Financial Resource Strain: Not on file   Food Insecurity: Not on file   Transportation  Needs: Not on file   Physical Activity: Not on file   Stress: Not on file   Social Connections: Not on file   Interpersonal Safety: Not on file   Housing Stability: Not on file     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 Has 2 children, ages 5 and 7.  Smoking: No  Alcohol: No    Family History     No family history on file.   No family history of diabetes or autoimmune disease.  Dad's side: HTN, cardiac disease  Mom's side: Mom with hypothyroidism    ROS     12 ROS completed, pertinent positive and negative in HPI    Physical Exam   There were no vitals taken for this visit.   GENERAL: Healthy, alert and no distress  EYES: Eyes grossly normal to inspection.  No discharge or erythema, or obvious scleral/conjunctival abnormalities.  RESP: No audible wheeze, cough, or visible cyanosis.  No visible retractions or increased work of breathing.    SKIN: Visible skin clear. No significant rash, abnormal pigmentation or lesions.  NEURO: Cranial nerves grossly intact.  Mentation and speech appropriate for age.  PSYCH: Mentation appears normal, affect normal/bright, judgement and insight intact, normal speech and appearance well-groomed.     Labs/Imaging     Pertinent Labs were reviewed and discussed briefly.  Radiology Results were  reviewed.    Summary of recent findings:   3/2/22   Cr: 0.93    2/9/22  A1c: 7.2  Cholesterol: 144  Triglycerides: 34  LDL: 77  TSH: 1.6  Vit D: 19.55 (low)     I personally reviewed the patient's outside records from Care Everywhere. Summary of pertinent findings in HPI.    Impression / Plan     1. 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 We discussed increasing his ICR slightly at 9am-12am from 1:13 to 1:12.     2. Diabetes Complications: None. Due for eye exam. Referral placed.     3. Blood Pressure Management: Blood pressure is 120-130s/70-80s at home. Currently is on pharmacotherapy for this.     4.Lipid Management:  Per the new ACC/MAYRA/NHLBI guidelines, statins are recommended for individuals with diabetes aged 40-75 with LDL  without ASCVD, and for any individual with ASCVD. Currently the patient is on a statin.       5. Smoking Status: Patient is smoke free.    Review of the result(s) of each unique test - A1c and diabetes related labs.         Test and/or medications prescribed today:  No orders of the defined types were placed in this encounter.        Follow up: 3 months with PA. 6 months with MD.       Juanpablo Rico MD  Endocrinology, Diabetes and Metabolism  [facility]

## 2023-10-03 ENCOUNTER — TELEPHONE (OUTPATIENT)
Dept: ENDOCRINOLOGY | Facility: CLINIC | Age: 38
End: 2023-10-03
Payer: COMMERCIAL

## 2023-10-03 ENCOUNTER — MYC MEDICAL ADVICE (OUTPATIENT)
Dept: ENDOCRINOLOGY | Facility: CLINIC | Age: 38
End: 2023-10-03
Payer: COMMERCIAL

## 2023-10-03 NOTE — TELEPHONE ENCOUNTER
Left Voicemail (1st Attempt) and Sent Mychart (1st Attempt) for the patient to call back and schedule the following:    Appointment type: Return Diabetes  Provider: yan BERNARDO Dr.  Return date: 3 months with PA, 6 months with Dr. Rico  Specialty phone number: [phone]  Additional appointment(s) needed: N/A  Additional Notes: N/A    Adolfo Barber on 10/3/2023 at 4:51 PM

## 2023-10-09 NOTE — TELEPHONE ENCOUNTER
[facility]   DATE RECEIVED: 10/9/23   NOTES (FOR ALL VISITS) STATUS DETAILS   MEDICATION LIST Internal    IMAGING      XR (Chest) Internal 1/13/23   LABS     DIABETES: HBGA1C, CREATININE, FASTING LIPIDS, MICROALBUMIN URINE, POTASSIUM, TSH, T4    THYROID: TSH, T4, CBC, THYRODLONULIN, TOTAL T3, FREE T4, CALCITONIN, CEA   INTERNAL CBC: 5/3/23  HBGA1C: 5/3/23  TSH W/ FREE T4: 5/3/23

## 2023-10-24 ENCOUNTER — MYC MEDICAL ADVICE (OUTPATIENT)
Dept: EDUCATION SERVICES | Facility: CLINIC | Age: 38
End: 2023-10-24
Payer: COMMERCIAL

## 2023-10-24 DIAGNOSIS — E10.9 TYPE 1 DIABETES MELLITUS WITHOUT COMPLICATION (H): ICD-10-CM

## 2023-10-25 RX ORDER — INSULIN PMP CART,AUT,G6/7,CNTR
1 EACH SUBCUTANEOUS
Qty: 45 EACH | Refills: 3 | Status: SHIPPED | OUTPATIENT
Start: 2023-10-25

## 2023-10-25 RX ORDER — PROCHLORPERAZINE 25 MG/1
SUPPOSITORY RECTAL
Qty: 9 EACH | Refills: 3 | Status: SHIPPED | OUTPATIENT
Start: 2023-10-25 | End: 2024-01-31

## 2023-11-27 ENCOUNTER — MYC REFILL (OUTPATIENT)
Dept: EDUCATION SERVICES | Facility: CLINIC | Age: 38
End: 2023-11-27
Payer: COMMERCIAL

## 2023-11-27 DIAGNOSIS — E10.9 TYPE 1 DIABETES MELLITUS WITHOUT COMPLICATION (H): ICD-10-CM

## 2023-11-27 RX ORDER — PROCHLORPERAZINE 25 MG/1
SUPPOSITORY RECTAL
Qty: 1 EACH | Refills: 4 | Status: SHIPPED | OUTPATIENT
Start: 2023-11-27 | End: 2024-01-31

## 2023-12-31 ENCOUNTER — HEALTH MAINTENANCE LETTER (OUTPATIENT)
Age: 38
End: 2023-12-31

## 2024-01-22 NOTE — PROGRESS NOTES
Outcome for 01/22/24 4:18 PM: Data uploaded on [employer]umm[employer] and [employer]  Bailee Bradshaw LPN   Outcome for 01/29/24 8:26 AM: Data obtained via Dexcom and [employer] website  Bailee Bradshaw LPN       Patient is showing 4/5 MNCM met. BP not on file  Bailee Bradshaw LPN

## 2024-01-31 ENCOUNTER — VIRTUAL VISIT (OUTPATIENT)
Dept: ENDOCRINOLOGY | Facility: CLINIC | Age: 39
End: 2024-01-31
Payer: COMMERCIAL

## 2024-01-31 ENCOUNTER — PRE VISIT (OUTPATIENT)
Dept: ENDOCRINOLOGY | Facility: CLINIC | Age: 39
End: 2024-01-31

## 2024-01-31 DIAGNOSIS — E10.9 TYPE 1 DIABETES MELLITUS WITHOUT COMPLICATION (H): Primary | ICD-10-CM

## 2024-01-31 PROCEDURE — 99215 OFFICE O/P EST HI 40 MIN: CPT | Mod: 95 | Performed by: PHYSICIAN ASSISTANT

## 2024-01-31 RX ORDER — PROCHLORPERAZINE 25 MG/1
SUPPOSITORY RECTAL
Qty: 9 EACH | Refills: 3 | Status: SHIPPED | OUTPATIENT
Start: 2024-01-31

## 2024-01-31 RX ORDER — PROCHLORPERAZINE 25 MG/1
SUPPOSITORY RECTAL
Qty: 1 EACH | Refills: 4 | Status: SHIPPED | OUTPATIENT
Start: 2024-01-31

## 2024-01-31 ASSESSMENT — PAIN SCALES - GENERAL: PAINLEVEL: NO PAIN (0)

## 2024-01-31 NOTE — PATIENT INSTRUCTIONS
Please add 5 pm I/C ratio of 1:10 and add sensitivity of 38 at 5 pm.  I placed an order for annual fasting diabetes labs to be done prior to your upcoming visit with Dr. Rico in March 2024.  Refills for DexcomG6 sensors and transmitter placed today.  It was nice meeting you and take care.  Kendal Briggs PA-C

## 2024-01-31 NOTE — PROGRESS NOTES
Time of start: 1:39 pm   Time of end: 2:02 pm   Total duration of video visit:23 minutes.  Providers location: offsite.  Patients location: [place].    HPI  Atul Fernandez is a 38 year old male with type 1 diabetes mellitus.  Pt seen by Dr. Rico in Sept 2023.  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  No hx of diabetic retinopathy, nephropathy or neuropathy.  For his diabetes, he is using an OmniPod5 insulin pump and DexcomG6 sensor.  Basal rates are below:  Midnight = 1.5 units/hr  6 am = 1.4 units/hr  3:30 pm = 1.3 units/hr  8 pm = 1.5 units/hr.  I/C ratio :  Midnight 1:14  6 am 1:10  9 pm 1:11  CF is 40 x 24 hrs.  Most recent A1C was  7.7 % on 5/3/2023.  I reviewed his DexcomG6 sensor and OmniPod5 insulin pump data- scanned below.  His blood sugar is high following his evening meal and remain elevated close to bedtime.  No frequent hypoglycemia.  On ROS today, doing well.  Denies blurred vision, n/v, SOB at rest, cough or fever.  No chest pain, abd pain, diarrhea, dysuria or hematuria.  Pt denies sx of neuropathy or foot ulcers.    Diabetes Care  Retinopathy: none; he has an Oph exam scheduled for later this month.  Nephropathy: none; urine microalbuminuria negative in May 2023.  Pt is taking Monopril.  Neuropathy: none.  Foot Exam: no exam today.  Taking aspirin: no.  Lipids: LDL 85 in May 2023. Pt taking Lipitor.  Insulin: OmniPod5 insulin pump.  Testing: DexcomG6 sensor.  Hypoglycemia tx;: has Baqsimi.  Back up insulin: will discuss having back up basal insulin in case pump fails with pt next visit.                          ROS  Please see under HPI.    Allergies  Allergies   Allergen Reactions    Nsaids Angioedema, Hives and Swelling     Eyes swelling      Shellfish Allergy Swelling       Medications  Current Outpatient Medications   Medication Sig Dispense Refill    atorvastatin (LIPITOR) 10 MG tablet Take 1 tablet (10 mg) by  mouth daily at 2 pm 90 tablet 3    Continuous Blood Gluc Sensor (DEXCOM G6 SENSOR) MISC Change every 10 days. 9 each 3    Continuous Blood Gluc Transmit (DEXCOM G6 TRANSMITTER) MISC Change every 3 months. 1 each 4    fosinopril (MONOPRIL) 20 MG tablet Take 1 tablet (20 mg) by mouth daily 90 tablet 3    Glucagon (BAQSIMI) 3 MG/DOSE nasal powder Spray 1 spray (3 mg) in nostril as needed (To treat severe hypoglycemia) in the event of unconscious hypoglycemia or hypoglycemic seizure. May repeat dose if no response after 15 minutes. 2 each 1    HUMALOG 100 UNIT/ML injection FOR USE IN INSULIN PUMP (MAX 70 UNITS PER DAY)      insulin aspart (NOVOLOG VIAL) 100 UNITS/ML vial FOR USE IN INSULIN PUMP (MAX 70 UNITS PER DAY) 70 mL 3    Insulin Disposable Pump (OMNIPOD 5 G6 INTRO, GEN 5,) KIT 1 each every 72 hours 1 kit 0    Insulin Disposable Pump (OMNIPOD 5 G6 POD, GEN 5,) MISC 1 each every 72 hours 45 each 3    KETOSTIX test strip Use as daily as needed 50 strip 1       Family History  No family hx of diabetes.    Social History   reports that he has never smoked. He has never been exposed to tobacco smoke. He has never used smokeless tobacco.     Past Medical History  Type 1 DM dx in 2012.    Physical Exam    No exam today.    RESULTS  Creatinine   Date Value Ref Range Status   05/03/2023 0.96 0.67 - 1.17 mg/dL Final     GFR Estimate   Date Value Ref Range Status   05/03/2023 >90 >60 mL/min/1.73m2 Final     Comment:     eGFR calculated using 2021 CKD-EPI equation.     Hemoglobin A1C   Date Value Ref Range Status   05/03/2023 7.7 (H) 0.0 - 5.6 % Final     Comment:     Normal <5.7%   Prediabetes 5.7-6.4%    Diabetes 6.5% or higher     Note: Adopted from ADA consensus guidelines.     ALT   Date Value Ref Range Status   05/03/2023 65 (H) 10 - 50 U/L Final     AST   Date Value Ref Range Status   05/03/2023 28 10 - 50 U/L Final     TSH   Date Value Ref Range Status   05/03/2023 2.35 0.30 - 4.20 uIU/mL Final       Cholesterol    Date Value Ref Range Status   05/03/2023 150 <200 mg/dL Final     Direct Measure HDL   Date Value Ref Range Status   05/03/2023 57 >=40 mg/dL Final     LDL Cholesterol Calculated   Date Value Ref Range Status   05/03/2023 85 <=100 mg/dL Final     Triglycerides   Date Value Ref Range Status   05/03/2023 40 <150 mg/dL Final         ASSESSMENT/PLAN:      TYPE 1 DIABETES MELLITUS: Type 1 DM with no microvascular complications. Pt's blood sugars are high following his evening meal and stay above target into the late evening hours. I suggested adding a 5 pm I/C ratio of 1:10 ( was 1:11 ) and add a 5 pm CF 38 ( was 35 ).  No other changes today. No hx of diabetic retinopathy and pt has Oph exam scheduled for later this month. His urine microalbuminuria was negative in May 2023. Pt is taking Monopril.  Creat/GFR normal in May 2023. No sx of diabetic neuropathy or foot ulcers/sores.  No vitals today.  When he checks his BP the readings are 120-130 / 80 range per patient.  HEALTH MAINTENANCE: Will discuss need for PCP next visit for his health maintenance.  FOLLOW UP : With Dr. Rico in 3/2024.  A1C and annual diabetic labs ordered and to be done prior to next visit.      Time spent reviewing chart,labs, OmniPod5 insulin pump and DexcomG6 sensor data today = 8 minutes.  Time for video visit today = 23 minutes.  Time for documentation today = 15 minutes.      Total time for visit today = 46 minutes.    Kendal Briggs PA-C

## 2024-01-31 NOTE — LETTER
1/31/2024       RE: Atul Fernandez  [address]  [place] [address]     Dear Colleague,    Thank you for referring your patient, Atul Fernandez, to the [facility] [place] at [facility]. Please see a copy of my visit note below.    Outcome for 01/22/24 4:18 PM: Data uploaded on Dexcom and eedeno  Bailee Bradshaw LPN   Outcome for 01/29/24 8:26 AM: Data obtained via Dexcom and [employer] website  Bailee Bradshaw LPN       Patient is showing 4/5 [place] met. BP not on file  Bailee Bradshaw LPN                                                Virtual Visit Details    Type of service:  Video Visit     Originating Location (pt. Location):     Distant Location (provider location):    Platform used for Video Visit:     Time of start: 1:39 pm   Time of end: 2:02 pm   Total duration of video visit:23 minutes.  Providers location: offsite.  Patients location: [place].    [facility]  Atul Fernandez is a 38 year old male with type 1 diabetes mellitus.  Pt seen by Dr. Rico in Sept 2023.  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  No hx of diabetic retinopathy, nephropathy or neuropathy.  For his diabetes, he is using an OmniPod5 insulin pump and DexcomG6 sensor.  Basal rates are below:  Midnight = 1.5 units/hr  6 am = 1.4 units/hr  3:30 pm = 1.3 units/hr  8 pm = 1.5 units/hr.  I/C ratio :  Midnight 1:14  6 am 1:10  9 pm 1:11  CF is 40 x 24 hrs.  Most recent A1C was  7.7 % on 5/3/2023.  I reviewed his DexcomG6 sensor and OmniPod5 insulin pump data- scanned below.  His blood sugar is high following his evening meal and remain elevated close to bedtime.  No frequent hypoglycemia.  On ROS today, doing well.  Denies blurred vision, n/v, SOB at rest, cough or fever.  No chest pain, abd pain, diarrhea, dysuria or hematuria.  Pt denies sx of neuropathy or foot ulcers.    Diabetes  Care  Retinopathy: none; he has an Oph exam scheduled for later this month.  Nephropathy: none; urine microalbuminuria negative in May 2023.  Pt is taking Monopril.  Neuropathy: none.  Foot Exam: no exam today.  Taking aspirin: no.  Lipids: LDL 85 in May 2023. Pt taking Lipitor.  Insulin: OmniPod5 insulin pump.  Testing: DexcomG6 sensor.  Hypoglycemia tx;: has Baqsimi.  Back up insulin: will discuss having back up basal insulin in case pump fails with pt next visit.                          ROS  Please see under HPI.    Allergies  Allergies   Allergen Reactions    Nsaids Angioedema, Hives and Swelling     Eyes swelling      Shellfish Allergy Swelling       Medications  Current Outpatient Medications   Medication Sig Dispense Refill    atorvastatin (LIPITOR) 10 MG tablet Take 1 tablet (10 mg) by mouth daily at 2 pm 90 tablet 3    Continuous Blood Gluc Sensor (DEXCOM G6 SENSOR) MISC Change every 10 days. 9 each 3    Continuous Blood Gluc Transmit (DEXCOM G6 TRANSMITTER) MISC Change every 3 months. 1 each 4    fosinopril (MONOPRIL) 20 MG tablet Take 1 tablet (20 mg) by mouth daily 90 tablet 3    Glucagon (BAQSIMI) 3 MG/DOSE nasal powder Spray 1 spray (3 mg) in nostril as needed (To treat severe hypoglycemia) in the event of unconscious hypoglycemia or hypoglycemic seizure. May repeat dose if no response after 15 minutes. 2 each 1    HUMALOG 100 UNIT/ML injection FOR USE IN INSULIN PUMP (MAX 70 UNITS PER DAY)      insulin aspart (NOVOLOG VIAL) 100 UNITS/ML vial FOR USE IN INSULIN PUMP (MAX 70 UNITS PER DAY) 70 mL 3    Insulin Disposable Pump (OMNIPOD 5 G6 INTRO, GEN 5,) KIT 1 each every 72 hours 1 kit 0    Insulin Disposable Pump (OMNIPOD 5 G6 POD, GEN 5,) MISC 1 each every 72 hours 45 each 3    KETOSTIX test strip Use as daily as needed 50 strip 1       Family History  No family hx of diabetes.    Social History   reports that he has never smoked. He has never been exposed to tobacco smoke. He has never used smokeless  tobacco.     Past Medical History  Type 1 DM dx in 2012.    Physical Exam    No exam today.    RESULTS  Creatinine   Date Value Ref Range Status   05/03/2023 0.96 0.67 - 1.17 mg/dL Final     GFR Estimate   Date Value Ref Range Status   05/03/2023 >90 >60 mL/min/1.73m2 Final     Comment:     eGFR calculated using 2021 CKD-EPI equation.     Hemoglobin A1C   Date Value Ref Range Status   05/03/2023 7.7 (H) 0.0 - 5.6 % Final     Comment:     Normal <5.7%   Prediabetes 5.7-6.4%    Diabetes 6.5% or higher     Note: Adopted from ADA consensus guidelines.     ALT   Date Value Ref Range Status   05/03/2023 65 (H) 10 - 50 U/L Final     AST   Date Value Ref Range Status   05/03/2023 28 10 - 50 U/L Final     TSH   Date Value Ref Range Status   05/03/2023 2.35 0.30 - 4.20 uIU/mL Final       Cholesterol   Date Value Ref Range Status   05/03/2023 150 <200 mg/dL Final     Direct Measure HDL   Date Value Ref Range Status   05/03/2023 57 >=40 mg/dL Final     LDL Cholesterol Calculated   Date Value Ref Range Status   05/03/2023 85 <=100 mg/dL Final     Triglycerides   Date Value Ref Range Status   05/03/2023 40 <150 mg/dL Final         ASSESSMENT/PLAN:      TYPE 1 DIABETES MELLITUS: Type 1 DM with no microvascular complications. Pt's blood sugars are high following his evening meal and stay above target into the late evening hours. I suggested adding a 5 pm I/C ratio of 1:10 ( was 1:11 ) and add a 5 pm CF 38 ( was 35 ).  No other changes today. No hx of diabetic retinopathy and pt has Oph exam scheduled for later this month. His urine microalbuminuria was negative in May 2023. Pt is taking Monopril.  Creat/GFR normal in May 2023. No sx of diabetic neuropathy or foot ulcers/sores.  No vitals today.  When he checks his BP the readings are 120-130 / 80 range per patient.  HEALTH MAINTENANCE: Will discuss need for PCP next visit for his health maintenance.  FOLLOW UP : With Dr. Rico in 3/2024.  A1C and annual diabetic labs ordered  and to be done prior to next visit.      Time spent reviewing chart,labs, OmniPod5 insulin pump and DexcomG6 sensor data today = 8 minutes.  Time for video visit today = 23 minutes.  Time for documentation today = 15 minutes.      Total time for visit today = 46 minutes.    Kendal Briggs PA-C

## 2024-01-31 NOTE — NURSING NOTE
Is the patient currently in the state of [place]? YES    Visit mode:VIDEO    If the visit is dropped, the patient can be reconnected by: VIDEO VISIT: Text to cell phone:   Telephone Information:   Mobile [phone]       Will anyone else be joining the visit? NO  (If patient encounters technical issues they should call [phone]    How would you like to obtain your AVS? MyChart    Are changes needed to the allergy or medication list? No    Reason for visit: Consult    Shelby Kocher VVF

## 2024-02-28 ENCOUNTER — OFFICE VISIT (OUTPATIENT)
Dept: OPHTHALMOLOGY | Facility: CLINIC | Age: 39
End: 2024-02-28
Attending: STUDENT IN AN ORGANIZED HEALTH CARE EDUCATION/TRAINING PROGRAM
Payer: COMMERCIAL

## 2024-02-28 DIAGNOSIS — E10.9 TYPE 1 DIABETES MELLITUS WITHOUT RETINOPATHY (H): Primary | ICD-10-CM

## 2024-02-28 DIAGNOSIS — H52.13 MYOPIA OF BOTH EYES WITH ASTIGMATISM: ICD-10-CM

## 2024-02-28 DIAGNOSIS — H52.203 MYOPIA OF BOTH EYES WITH ASTIGMATISM: ICD-10-CM

## 2024-02-28 DIAGNOSIS — H18.603 KERATOCONUS OF BOTH EYES: ICD-10-CM

## 2024-02-28 PROCEDURE — 92004 COMPRE OPH EXAM NEW PT 1/>: CPT | Mod: GC | Performed by: STUDENT IN AN ORGANIZED HEALTH CARE EDUCATION/TRAINING PROGRAM

## 2024-02-28 PROCEDURE — 92025 CPTRIZED CORNEAL TOPOGRAPHY: CPT | Performed by: STUDENT IN AN ORGANIZED HEALTH CARE EDUCATION/TRAINING PROGRAM

## 2024-02-28 PROCEDURE — 99213 OFFICE O/P EST LOW 20 MIN: CPT | Performed by: STUDENT IN AN ORGANIZED HEALTH CARE EDUCATION/TRAINING PROGRAM

## 2024-02-28 ASSESSMENT — VISUAL ACUITY
OD_CC: 20/250
OS_CC: 20/20
CORRECTION_TYPE: GLASSES
METHOD: SNELLEN - LINEAR
OD_PH_CC: 20/50
OS_CC+: -1

## 2024-02-28 ASSESSMENT — REFRACTION_MANIFEST
OD_CYLINDER: +5.00
OD_AXIS: 002
OS_SPHERE: -6.75
OS_AXIS: 033
OS_CYLINDER: +1.50
OD_SPHERE: -6.50

## 2024-02-28 ASSESSMENT — CONF VISUAL FIELD
OD_INFERIOR_NASAL_RESTRICTION: 0
OD_INFERIOR_TEMPORAL_RESTRICTION: 0
OD_NORMAL: 1
OD_SUPERIOR_TEMPORAL_RESTRICTION: 0
OS_INFERIOR_TEMPORAL_RESTRICTION: 0
OS_SUPERIOR_NASAL_RESTRICTION: 0
OS_INFERIOR_NASAL_RESTRICTION: 0
METHOD: COUNTING FINGERS
OD_SUPERIOR_NASAL_RESTRICTION: 0
OS_NORMAL: 1
OS_SUPERIOR_TEMPORAL_RESTRICTION: 0

## 2024-02-28 ASSESSMENT — EXTERNAL EXAM - LEFT EYE: OS_EXAM: NORMAL

## 2024-02-28 ASSESSMENT — PACHYMETRY
OS_CT(UM): 524
OD_CT(UM): 460

## 2024-02-28 ASSESSMENT — TONOMETRY
OD_IOP_MMHG: 10
OS_IOP_MMHG: 17
IOP_METHOD: ICARE

## 2024-02-28 ASSESSMENT — REFRACTION_WEARINGRX
OS_CYLINDER: +1.50
OS_SPHERE: -6.75
OD_AXIS: 002
OD_SPHERE: -6.50
OD_CYLINDER: +5.00
OS_AXIS: 033

## 2024-02-28 ASSESSMENT — SLIT LAMP EXAM - LIDS
COMMENTS: NORMAL
COMMENTS: NORMAL

## 2024-02-28 ASSESSMENT — CUP TO DISC RATIO
OD_RATIO: 0.2
OS_RATIO: 0.2

## 2024-02-28 ASSESSMENT — EXTERNAL EXAM - RIGHT EYE: OD_EXAM: NORMAL

## 2024-02-28 NOTE — PROGRESS NOTES
"HPI       New Patient    In both eyes.  Associated symptoms include Negative for flashes and floaters.  Treatments tried include no treatments.  Pain was noted as 6/10. Additional comments: New patient for DM eye exam.               Comments    The patient reports stable vision.  The patient notes he has stable keratoconus (right to left).  His right eye vision is worse than his left eye vision.  He uses no eye drops at this time.   He has tried rigid gas perm lenses over twenty years.  He reports moderate light sensitivity.  Lab Results       Component                Value               Date                       A1C                      7.7                 05/03/2023             STEPHANIE Rust, COA 12:57 PM 02/28/2024                Last edited by Xiao Navarro COA on 2/28/2024 12:57 PM.          Review of systems for the eyes was negative other than the pertinent positives/negatives listed in the HPI.    Ocular Meds: none    Ocular Hx: refractive error OU; keratoconus OU    FOHx: several family members with keratoconus    PMHx:   Past Medical History:   Diagnosis Date    Diabetes (H)     Keratoconus of both eyes        Assessment & Plan      Dr. Pollard NO mi Denise is a 38 year old male with the following diagnoses:    Keratoconus OD > OS  - previously saw Dr Conn at  and everything noted to be stable and per Dr Conn's note:  - \" longstanding, stable, diagnosed at age 11 - has been followed by cornea specialists in the past, told to leave alone as long term stable  - topography today very stable to 2021 and back to 2015 (2018 measurements different, likely error)\"  - Pentacam today appears stable to prior:   -- right eye - irregular astigmatism with inferior paracentral steepening, 60.6/67.0 @ 106.5; Kmax 79.4, thinnest local 388 um  -- left eye - irregular astigmatism with inferior steepening, 42.3/44.0 @ 48.3; Kmax 48.1, thinnest local 488 um  - tried scleral lenses and soft " lenses in the past but did not like them, offered referral to Dr. Sarmiento, he will think about it but is happy with his glasses currently    T1DM without Retinopathy OU  - strict BP/BG control  - last A1c 7.7  - patient understands importance  of good blood glucose control in order to reduce the risk of developing diabetic retinopathy  - yearly DFE    High myopia with astigmatism and presbyopia, each eye   - updated Mrx today, stable to prior    Counseled return/RD precautions  Letter to referring provider    Patient disposition:   Return in about 1 year (around 2/28/2025) for Annual Visit, Pentacam, or sooner changes.    Stephanie Fink MD  Ophthalmology Resident, PGY-4  [facility]    Attending Physician Attestation:  Complete documentation of historical and exam elements from today's encounter can be found in the full encounter summary report (not reduplicated in this progress note).  I personally obtained the chief complaint(s) and history of present illness.  I confirmed and edited as necessary the review of systems, past medical/surgical history, family history, social history, and examination findings as documented by others; and I examined the patient myself.  I personally reviewed the relevant tests, images, and reports as documented above.  I formulated and edited as necessary the assessment and plan and discussed the findings and management plan with the patient and family. . - Genevieve Wisdom MD

## 2024-02-28 NOTE — LETTER
"2/28/2024       RE: Atul Walker  [address] [address]     Dear Colleague,    Thank you for referring your patient, Atul Walker, to the [facility] [place] at [facility]. Please see a copy of my visit note below.    HPI       New Patient    In both eyes.  Associated symptoms include Negative for flashes and floaters.  Treatments tried include no treatments.  Pain was noted as 6/10. Additional comments: New patient for DM eye exam.               Comments    The patient reports stable vision.  The patient notes he has stable keratoconus (right to left).  His right eye vision is worse than his left eye vision.  He uses no eye drops at this time.   He has tried rigid gas perm lenses over twenty years.  He reports moderate light sensitivity.  Lab Results       Component                Value               Date                       A1C                      7.7                 05/03/2023             STEPHANIE Rust, COA 12:57 PM 02/28/2024                Last edited by Xiao Navarro COA on 2/28/2024 12:57 PM.          Review of systems for the eyes was negative other than the pertinent positives/negatives listed in the HPI.    Ocular Meds: none    Ocular Hx: refractive error OU; keratoconus OU    FOHx: several family members with keratoconus    PMHx:   Past Medical History:   Diagnosis Date     Diabetes (H)      Keratoconus of both eyes        Assessment & Plan      Mandy Walker is a 38 year old male with the following diagnoses:    Keratoconus OD > OS  - previously saw Dr Conn at  and everything noted to be stable and per Dr Conn's note:  - \" longstanding, stable, diagnosed at age 11 - has been followed by cornea specialists in the past, told to leave alone as long term stable  - topography today very stable to 2021 and back to 2015 (2018 measurements different, " "likely error)\"  - Pentacam today appears stable to prior:   -- right eye - irregular astigmatism with inferior paracentral steepening, 60.6/67.0 @ 106.5; Kmax 79.4, thinnest local 388 um  -- left eye - irregular astigmatism with inferior steepening, 42.3/44.0 @ 48.3; Kmax 48.1, thinnest local 488 um  - tried scleral lenses and soft lenses in the past but did not like them, offered referral to Dr. Sarmiento, he will think about it but is happy with his glasses currently    T1DM without Retinopathy OU  - strict BP/BG control  - last A1c 7.7  - patient understands importance  of good blood glucose control in order to reduce the risk of developing diabetic retinopathy  - yearly DFE    High myopia with astigmatism and presbyopia, each eye   - updated Mrx today, stable to prior    Counseled return/RD precautions  Letter to referring provider    Patient disposition:   Return in about 1 year (around 2/28/2025) for Annual Visit, Pentacam, or sooner changes.    Stephanie Fink MD  Ophthalmology Resident, PGY-4  [facility]    Attending Physician Attestation:  Complete documentation of historical and exam elements from today's encounter can be found in the full encounter summary report (not reduplicated in this progress note).  I personally obtained the chief complaint(s) and history of present illness.  I confirmed and edited as necessary the review of systems, past medical/surgical history, family history, social history, and examination findings as documented by others; and I examined the patient myself.  I personally reviewed the relevant tests, images, and reports as documented above.  I formulated and edited as necessary the assessment and plan and discussed the findings and management plan with the patient and family. . - Genevieve Wisdom MD     Again, thank you for allowing me to participate in the care of your patient.      Sincerely,    Genevieve Wisdom MD      "

## 2024-02-28 NOTE — NURSING NOTE
Chief Complaints and History of Present Illnesses   Patient presents with    New Patient     New patient for DM eye exam.       Chief Complaint(s) and History of Present Illness(es)       New Patient              Laterality: both eyes    Associated symptoms: Negative for flashes and floaters    Treatments tried: no treatments    Pain scale: 6/10    Comments: New patient for DM eye exam.                Comments    The patient reports stable vision.  The patient notes he has stable keratoconus (right to left).  His right eye vision is worse than his left eye vision.  He uses no eye drops at this time.   He has tried rigid gas perm lenses over twenty years.  He reports moderate light sensitivity.  Lab Results       Component                Value               Date                       A1C                      7.7                 05/03/2023             Xiao Navarro, COA, COA 12:57 PM 02/28/2024

## 2024-03-10 ENCOUNTER — HEALTH MAINTENANCE LETTER (OUTPATIENT)
Age: 39
End: 2024-03-10

## 2024-03-27 ENCOUNTER — TELEPHONE (OUTPATIENT)
Dept: ENDOCRINOLOGY | Facility: CLINIC | Age: 39
End: 2024-03-27
Payer: COMMERCIAL

## 2024-03-27 NOTE — TELEPHONE ENCOUNTER
Patient confirmed scheduled appointment:  Date: 7/31   Time: 9:30   Visit type: return diabetes   Provider: Trisha   Location: [facility]   Testing/imaging: n/a     Yovany Nuno on 3/27/2024 at 12:53 PM

## 2024-05-05 ENCOUNTER — MYC MEDICAL ADVICE (OUTPATIENT)
Dept: ENDOCRINOLOGY | Facility: CLINIC | Age: 39
End: 2024-05-05
Payer: COMMERCIAL

## 2024-05-19 ENCOUNTER — HEALTH MAINTENANCE LETTER (OUTPATIENT)
Age: 39
End: 2024-05-19

## 2024-05-29 DIAGNOSIS — E10.9 TYPE 1 DIABETES MELLITUS WITHOUT COMPLICATION (H): ICD-10-CM

## 2024-05-31 ENCOUNTER — MYC REFILL (OUTPATIENT)
Dept: EDUCATION SERVICES | Facility: CLINIC | Age: 39
End: 2024-05-31
Payer: COMMERCIAL

## 2024-05-31 DIAGNOSIS — E10.9 TYPE 1 DIABETES MELLITUS WITHOUT COMPLICATION (H): ICD-10-CM

## 2024-05-31 RX ORDER — FOSINOPRIL SODIUM 20 MG/1
20 TABLET ORAL DAILY
Qty: 90 TABLET | Refills: 3 | Status: SHIPPED | OUTPATIENT
Start: 2024-05-31

## 2024-05-31 RX ORDER — ATORVASTATIN CALCIUM 10 MG/1
10 TABLET, FILM COATED ORAL
Qty: 90 TABLET | Refills: 3 | Status: SHIPPED | OUTPATIENT
Start: 2024-05-31

## 2024-06-04 RX ORDER — ATORVASTATIN CALCIUM 10 MG/1
10 TABLET, FILM COATED ORAL
Qty: 90 TABLET | Refills: 3 | OUTPATIENT
Start: 2024-06-04

## 2024-06-04 NOTE — TELEPHONE ENCOUNTER
atorvastatin (LIPITOR) 10 MG tablet 90 tablet 3 5/31/2024 -- No   Sig - Route: Take 1 tablet (10 mg) by mouth daily at 2 pm - Oral   Sent to pharmacy as: Atorvastatin Calcium 10 MG Oral Tablet (LIPITOR)   Class: E-Prescribe   Order: [id number]   E-Prescribing Status: Receipt confirmed by pharmacy (5/31/2024 11:03 AM CDT)     Printout Tracking    External Result Report     Pharmacy    [id number] - [place], [place] - [address]

## 2024-07-03 ENCOUNTER — MYC REFILL (OUTPATIENT)
Dept: ENDOCRINOLOGY | Facility: CLINIC | Age: 39
End: 2024-07-03
Payer: COMMERCIAL

## 2024-07-03 DIAGNOSIS — E10.9 TYPE 1 DIABETES MELLITUS WITHOUT COMPLICATION (H): ICD-10-CM

## 2024-07-03 RX ORDER — INSULIN ASPART 100 [IU]/ML
INJECTION, SOLUTION INTRAVENOUS; SUBCUTANEOUS
Qty: 70 ML | Refills: 3 | Status: SHIPPED | OUTPATIENT
Start: 2024-07-03

## 2024-07-30 NOTE — PROGRESS NOTES
Outcome for 07/30/24 10:52 AM: Data obtained via [employer] website  [employer]

## 2024-07-31 ENCOUNTER — VIRTUAL VISIT (OUTPATIENT)
Dept: ENDOCRINOLOGY | Facility: CLINIC | Age: 39
End: 2024-07-31
Payer: COMMERCIAL

## 2024-07-31 DIAGNOSIS — E10.9 TYPE 1 DIABETES MELLITUS WITHOUT COMPLICATION (H): Primary | ICD-10-CM

## 2024-07-31 PROCEDURE — G2211 COMPLEX E/M VISIT ADD ON: HCPCS | Mod: 95 | Performed by: STUDENT IN AN ORGANIZED HEALTH CARE EDUCATION/TRAINING PROGRAM

## 2024-07-31 PROCEDURE — 99215 OFFICE O/P EST HI 40 MIN: CPT | Mod: 95 | Performed by: STUDENT IN AN ORGANIZED HEALTH CARE EDUCATION/TRAINING PROGRAM

## 2024-07-31 NOTE — PROGRESS NOTES
Endocrinology Clinic Visit       Video-Visit Details    Type of service:  Video Visit    Joined the call at 7/31/2024, 9:40:08 am.  Left the call at 7/31/2024, 10:07:56 am.    Originating Location (pt. Location): Home        Distant Location (provider location):  Off-site    Mode of Communication:  Video Conference via [employer]    Physician has received verbal consent for a Video Visit from the patient? Yes    I spent a total of 40 minutes on the date of encounter reviewing medical records, evaluating the patient, coordinating care and documenting in the EHR, as detailed above.      NAME:  Atul Fernandez  PCP:  No primary care provider on file.  MRN:  [id number]  Reason for Consult:  DM1  Requesting Provider:  Referred Self    Chief Complaint     Chief Complaint   Patient presents with    RECHECK       History of Present Illness     Atul Fernandez is a 37 year old male who is seen in video visit for type 1 diabetes. He established with me on 2/2023. Las seen 9/2023.    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 A1c 13 at time of diagnosis. Positive antibodies per patient. 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 He started to use a closed looped pump (Minimed Medtronic 670G) in August 2017. then 770G pump.     Last visit he had a Medtronic 770G pump. Rarely misses covering meals. Covers meals before eating. Still has BG spikes after meals. He met with keisha Steve on 3/2023.    he switched to omnipod summer 2023. He is happy with his new pump. He established with deloris Briggs 1/2024.    Interval hx: he is exercising more. High intensity classes ( 20-30 min)  3-4 times a week after work.   He pauses his pump  during the intense part and resume it when stretching. BG goes up for short time after exercise then correct to normal within an hour.     He had covid 2 weeks ago, so here the download is showing one time exercise session. He has  been less active.     He changed his diet, he doing more protein, less carbs.    He lost some wt and now he has been gaining it back, but he thinks it is muscle mass that he gained now.         Previous A1C in records reviewed.   Lab Results   Component Value Date    A1C 7.7 (H) 05/03/2023         Diabetes Care/Complications:   Eyes: No DR. Last eye exam 2/2024  Kidneys: No, last urine microalb negative on 5/2023  Nerves: No symptoms of neuropathy  Smoking: No  Blood Pressure: reportedly controlled  Lipids: Yes, on statin, last LDL 85 on 5/2023  Macrovascular: No  Hypoglycemia:no concern    Previous DM related Hospitalization: No    Current treatment strategy:   Lantus 15u qPM if pump fails- has not had to use  Novolog via insulin pump    Blood Glucose Monitoring:             Outcome for 07/30/24 10:52 AM: Data obtained via [employer] website  [employer]                                    Diet:   Eats breakfast, lunch, and dinner.    Exercise: Walks    Social: No alcohol. 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    Problem List     DM1    Medications     Current Outpatient Medications   Medication Sig Dispense Refill    atorvastatin (LIPITOR) 10 MG tablet Take 1 tablet (10 mg) by mouth daily at 2 pm 90 tablet 3    Continuous Blood Gluc Sensor (DEXCOM G6 SENSOR) MISC Change every 10 days. 9 each 3    Continuous Blood Gluc Transmit (DEXCOM G6 TRANSMITTER) MISC Change every 3 months. 1 each 4    fosinopril (MONOPRIL) 20 MG tablet Take 1 tablet (20 mg) by mouth daily 90 tablet 3    Glucagon (BAQSIMI) 3 MG/DOSE nasal powder Spray 1 spray (3 mg) in nostril as needed (To treat severe hypoglycemia) in the event of unconscious hypoglycemia or hypoglycemic seizure. May repeat dose if no response after 15 minutes. 2 each 1    HUMALOG 100 UNIT/ML injection FOR USE IN INSULIN PUMP (MAX 70 UNITS PER DAY)      insulin aspart (NOVOLOG VIAL) 100 UNITS/ML vial FOR USE IN INSULIN PUMP (MAX 70 UNITS PER DAY) 70  mL 3    Insulin Disposable Pump (OMNIPOD 5 G6 INTRO, GEN 5,) KIT 1 each every 72 hours 1 kit 0    Insulin Disposable Pump (OMNIPOD 5 G6 POD, GEN 5,) MISC 1 each every 72 hours 45 each 3    KETOSTIX test strip Use as daily as needed 50 strip 1     No current facility-administered medications for this visit.        Allergies     Allergies   Allergen Reactions    Nsaids Angioedema, Hives and Swelling     Eyes swelling      Shellfish Allergy Swelling       Medical / Surgical History     DM1    Social History     Social History     Socioeconomic History    Marital status:      Spouse name: Not on file    Number of children: Not on file    Years of education: Not on file    Highest education level: Not on file   Occupational History    Not on file   Tobacco Use    Smoking status: Never     Passive exposure: Never    Smokeless tobacco: Never   Vaping Use    Vaping status: Never Used   Substance and Sexual Activity    Alcohol use: Not on file    Drug use: Not on file    Sexual activity: Not on file   Other Topics Concern    Not on file   Social History Narrative    Not on file     Social Determinants of Health     Financial Resource Strain: Not on file   Food Insecurity: Not on file   Transportation Needs: Not on file   Physical Activity: Not on file   Stress: Not on file   Social Connections: Not on file   Interpersonal Safety: Not on file   Housing Stability: Not on file     Works as a Neurologist at the [employer]. Moved from [place] in 2022. Has 2 children, ages 5 and 7.  Smoking: No  Alcohol: No    Family History     No family history on file.   No family history of diabetes or autoimmune disease.  Dad's side: HTN, cardiac disease  Mom's side: Mom with hypothyroidism    ROS     12 ROS completed, pertinent positive and negative in HPI    Physical Exam   There were no vitals taken for this visit.   GENERAL: Healthy, alert and no distress  EYES: Eyes grossly normal to inspection.  No discharge or erythema, or  obvious scleral/conjunctival abnormalities.  RESP: No audible wheeze, cough, or visible cyanosis.  No visible retractions or increased work of breathing.    SKIN: Visible skin clear. No significant rash, abnormal pigmentation or lesions.  NEURO: Cranial nerves grossly intact.  Mentation and speech appropriate for age.  PSYCH: Mentation appears normal, affect normal/bright, judgement and insight intact, normal speech and appearance well-groomed.     Labs/Imaging     Pertinent Labs were reviewed and discussed briefly.  Radiology Results were  reviewed.       I personally reviewed the patient's outside records from [facility] Everywhere. Summary of pertinent findings in HPI.    Impression / Plan     1. 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 We discussed using temp basal during time of exercise. He reported those past 2 weeks were off with covid infection and less activity. No changes to pump settings made today.     2. Diabetes Complications: None.  Due for labs     3. Blood Pressure Management: Blood pressure is 120-130s/70-80s at home. Currently is on pharmacotherapy for this.     4.Lipid Management: Per the new ACC/MAYRA/NHLBI guidelines, statins are recommended for individuals with diabetes aged 40-75 with LDL  without ASCVD, and for any individual with ASCVD. Currently the patient is on a statin.       5. Smoking Status: Patient is smoke free.    Review of the result(s) of each unique test - A1c and diabetes related labs.         Test and/or medications prescribed today:  No orders of the defined types were placed in this encounter.        Follow up: 3 months with PA. 6 months with MD.    The longitudinal plan of care for the diagnosis(es)/condition(s) as documented were addressed during this visit. Due to the added complexity in care, I will continue to support Atul in the subsequent management and with ongoing continuity of care.       Juanpablo Rico,  MD  Endocrinology, Diabetes and Metabolism  [facility]

## 2024-07-31 NOTE — NURSING NOTE
Current patient location:  work     Is the patient currently in the state [facility]? YES    Visit mode:VIDEO    If the visit is dropped, the patient can be reconnected by: VIDEO VISIT: Text to cell phone:   Telephone Information:   Mobile [phone]       Will anyone else be joining the visit? NO  (If patient encounters technical issues they should call [phone] :[phone])    How would you like to obtain your AVS? MyChart    Are changes needed to the allergy or medication list? Pt stated no med changes    Are refills needed on medications prescribed by this physician? NO    Reason for visit: IESHA KRUGER

## 2024-07-31 NOTE — LETTER
7/31/2024       RE: Atul Walker  [address] [facility] [address] [facility] [address]     Dear Colleague,    Thank you for referring your patient, Atul Walker, to the [facility] ENDOCRINOLOGY [facility]INIC [place] [facility]. Please see a copy of my visit note below.    Outcome for 07/30/24 10:52 AM: Data obtained via [employer]  [employer]                        Endocrinology Clinic Visit       Video-Visit Details    Type of service:  Video Visit    Joined the call at 7/31/2024, 9:40:08 am.  Left the call at 7/31/2024, 10:07:56 am.    Originating Location (pt. Location): Home        Distant Location (provider location):  Off-site    Mode of Communication:  Video Conference via [employer]    Physician has received verbal consent for a Video Visit from the patient? Yes    I spent a total of 40 minutes on the date of encounter reviewing medical records, evaluating the patient, coordinating care and documenting in the EHR, as detailed above.      NAME:  Atul Fernandez  PCP:  No primary care provider on file.  MRN:  [id number]  Reason for Consult:  DM1  Requesting Provider:  Referred Self    Chief Complaint     Chief Complaint   Patient presents with     RECHECK       History of Present Illness     Atul Leeroy Brewerda is a 37 year old male who is seen in video visit for type 1 diabetes. He established with me on 2/2023. Las seen 9/2023.    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 A1c 13 at time of diagnosis. Positive antibodies per patient. 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 He started to use a closed looped pump (Minimed Medtronic 670G) in August 2017. then 770G pump.     Last visit he had a Medtronic 770G pump. Rarely misses covering meals. Covers meals before eating. Still has BG spikes after meals. He met with keisha Steve on 3/2023.    he switched to omnipod  summer 2023. He is happy with his new pump. He established with deloris Briggs 1/2024.    Interval hx: he is exercising more. High intensity classes ( 20-30 min)  3-4 times a week after work.   He pauses his pump  during the intense part and resume it when stretching. BG goes up for short time after exercise then correct to normal within an hour.     He had covid 2 weeks ago, so here the download is showing one time exercise session. He has been less active.     He changed his diet, he doing more protein, less carbs.    He lost some wt and now he has been gaining it back, but he thinks it is muscle mass that he gained now.         Previous A1C in records reviewed.   Lab Results   Component Value Date    A1C 7.7 (H) 05/03/2023         Diabetes Care/Complications:   Eyes: No DR. Last eye exam 2/2024  Kidneys: No, last urine microalb negative on 5/2023  Nerves: No symptoms of neuropathy  Smoking: No  Blood Pressure: reportedly controlled  Lipids: Yes, on statin, last LDL 85 on 5/2023  Macrovascular: No  Hypoglycemia:no concern    Previous DM related Hospitalization: No    Current treatment strategy:   Lantus 15u qPM if pump fails- has not had to use  Novolog via insulin pump    Blood Glucose Monitoring:             Outcome for 07/30/24 10:52 AM: Data obtained via [employer] website  [employer]                                    Diet:   Eats breakfast, lunch, and dinner.    Exercise: Walks    Social: No alcohol. Works as Neurologist. Started at [employer] first week of December.    Problem List     DM1    Medications     Current Outpatient Medications   Medication Sig Dispense Refill     atorvastatin (LIPITOR) 10 MG tablet Take 1 tablet (10 mg) by mouth daily at 2 pm 90 tablet 3     Continuous Blood Gluc Sensor (DEXCOM G6 SENSOR) MISC Change every 10 days. 9 each 3     Continuous Blood Gluc Transmit (DEXCOM G6 TRANSMITTER) MISC Change every 3 months. 1 each 4     fosinopril (MONOPRIL) 20 MG tablet Take 1 tablet (20 mg) by  mouth daily 90 tablet 3     Glucagon (BAQSIMI) 3 MG/DOSE nasal powder Spray 1 spray (3 mg) in nostril as needed (To treat severe hypoglycemia) in the event of unconscious hypoglycemia or hypoglycemic seizure. May repeat dose if no response after 15 minutes. 2 each 1     HUMALOG 100 UNIT/ML injection FOR USE IN INSULIN PUMP (MAX 70 UNITS PER DAY)       insulin aspart (NOVOLOG VIAL) 100 UNITS/ML vial FOR USE IN INSULIN PUMP (MAX 70 UNITS PER DAY) 70 mL 3     Insulin Disposable Pump (OMNIPOD 5 G6 INTRO, GEN 5,) KIT 1 each every 72 hours 1 kit 0     Insulin Disposable Pump (OMNIPOD 5 G6 POD, GEN 5,) MISC 1 each every 72 hours 45 each 3     KETOSTIX test strip Use as daily as needed 50 strip 1     No current facility-administered medications for this visit.        Allergies     Allergies   Allergen Reactions     Nsaids Angioedema, Hives and Swelling     Eyes swelling       Shellfish Allergy Swelling       Medical / Surgical History     DM1    Social History     Social History     Socioeconomic History     Marital status:      Spouse name: Not on file     Number of children: Not on file     Years of education: Not on file     Highest education level: Not on file   Occupational History     Not on file   Tobacco Use     Smoking status: Never     Passive exposure: Never     Smokeless tobacco: Never   Vaping Use     Vaping status: Never Used   Substance and Sexual Activity     Alcohol use: Not on file     Drug use: Not on file     Sexual activity: Not on file   Other Topics Concern     Not on file   Social History Narrative     Not on file     Social Determinants of Health     Financial Resource Strain: Not on file   Food Insecurity: Not on file   Transportation Needs: Not on file   Physical Activity: Not on file   Stress: Not on file   Social Connections: Not on file   Interpersonal Safety: Not on file   Housing Stability: Not on file     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 Moved from [place] in 2022. Has  2 children, ages 5 and 7.  Smoking: No  Alcohol: No    Family History     No family history on file.   No family history of diabetes or autoimmune disease.  Dad's side: HTN, cardiac disease  Mom's side: Mom with hypothyroidism    ROS     12 ROS completed, pertinent positive and negative in HPI    Physical Exam   There were no vitals taken for this visit.   GENERAL: Healthy, alert and no distress  EYES: Eyes grossly normal to inspection.  No discharge or erythema, or obvious scleral/conjunctival abnormalities.  RESP: No audible wheeze, cough, or visible cyanosis.  No visible retractions or increased work of breathing.    SKIN: Visible skin clear. No significant rash, abnormal pigmentation or lesions.  NEURO: Cranial nerves grossly intact.  Mentation and speech appropriate for age.  PSYCH: Mentation appears normal, affect normal/bright, judgement and insight intact, normal speech and appearance well-groomed.     Labs/Imaging     Pertinent Labs were reviewed and discussed briefly.  Radiology Results were  reviewed.       I personally reviewed the patient's outside records from [facility] Everywhere. Summary of pertinent findings in HPI.    Impression / Plan     1. 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 We discussed using temp basal during time of exercise. He reported those past 2 weeks were off with covid infection and less activity. No changes to pump settings made today.     2. Diabetes Complications: None.  Due for labs     3. Blood Pressure Management: Blood pressure is 120-130s/70-80s at home. Currently is on pharmacotherapy for this.     4.Lipid Management: Per the new ACC/MAYRA/NHLBI guidelines, statins are recommended for individuals with diabetes aged 40-75 with LDL  without ASCVD, and for any individual with ASCVD. Currently the patient is on a statin.       5. Smoking Status: Patient is smoke free.    Review of the result(s) of each unique test - A1c and diabetes  related labs.         Test and/or medications prescribed today:  No orders of the defined types were placed in this encounter.        Follow up: 3 months with PA. 6 months with MD.    The longitudinal plan of care for the diagnosis(es)/condition(s) as documented were addressed during this visit. Due to the added complexity in care, I will continue to support tAul in the subsequent management and with ongoing continuity of care.       Juanpablo Rico MD  Endocrinology, Diabetes and Metabolism  [facility]         Again, thank you for allowing me to participate in the care of your patient.      Sincerely,    Juanpablo Rico MD

## 2024-10-06 ENCOUNTER — HEALTH MAINTENANCE LETTER (OUTPATIENT)
Age: 39
End: 2024-10-06

## 2024-10-15 NOTE — PROGRESS NOTES
Outcome for 10/15/24 1:20 PM: Data uploaded on Dexcom and Glokevin Gonzalez MA  Outcome for 11/04/24 10:52 AM: Data obtained via Dex[employer] and [employer] website  Tova Gonzalez MA

## 2024-10-31 DIAGNOSIS — E10.9 TYPE 1 DIABETES MELLITUS WITHOUT COMPLICATION (H): ICD-10-CM

## 2024-10-31 RX ORDER — INSULIN PMP CART,AUT,G6/7,CNTR
1 EACH SUBCUTANEOUS
Qty: 45 EACH | Refills: 3 | Status: SHIPPED | OUTPATIENT
Start: 2024-10-31

## 2024-11-06 ENCOUNTER — TELEPHONE (OUTPATIENT)
Dept: ENDOCRINOLOGY | Facility: CLINIC | Age: 39
End: 2024-11-06

## 2024-11-06 ENCOUNTER — VIRTUAL VISIT (OUTPATIENT)
Dept: ENDOCRINOLOGY | Facility: CLINIC | Age: 39
End: 2024-11-06
Payer: COMMERCIAL

## 2024-11-06 DIAGNOSIS — E10.9 TYPE 1 DIABETES MELLITUS WITHOUT COMPLICATION (H): Primary | ICD-10-CM

## 2024-11-06 PROCEDURE — 99214 OFFICE O/P EST MOD 30 MIN: CPT | Mod: 95 | Performed by: PHYSICIAN ASSISTANT

## 2024-11-06 ASSESSMENT — PAIN SCALES - GENERAL: PAINLEVEL_OUTOF10: NO PAIN (0)

## 2024-11-06 NOTE — PROGRESS NOTES
Time of start: 7:55 am   Time of end: 8:13 am   Total duration of video visit: 18 minutes.  Providers location: offsite.  Patients location: [place].    [facility]  Atul Walker is a 39 year old male with type 1 diabetes mellitus.  Pt last seen by Dr. Rico in July 2024.   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  No hx of diabetic retinopathy, nephropathy or neuropathy.  For his diabetes, he is using an OmniPod5 insulin pump and DexcomG6 sensor.  Basal rates are below:  Midnight = 1.5 units/hr  6 am = 1.4 units/hr  3:30 pm = 1.3 units/hr  8 pm = 1.5 units/hr.  I/C ratio :  Midnight 1:14  6 am 1:10  9 pm 1:11  5 pm  1:10  CF is 40 at midnight and 38 at 5 PM.  Most recent A1C was  7.7 % on 5/3/2023.  I reviewed and scanned his OmniPod5 insulin pump and DexcomG6 sensor download data in his note below.  Average glucose 155 with SD 47.  Glucose in target range 72% of the time with no frequent hypoglycemia.  Pt has been having a difficult time getting his OmniPod5 supplies refilled.  I have requested that this be taken care of today.  On ROS today, denies blurred vision, n/v, SOB at rest, cough or fever.  No chest pain, abd pain, diarrhea, dysuria or hematuria.  Pt denies sx of neuropathy or foot ulcers.    Diabetes Care  Retinopathy: none; he was seen by ophthalmology here in February 2020 diabetic retinopathy.    Nephropathy: none; urine microalbuminuria negative in May 2023.  Pt is taking Monopril.  Neuropathy: none.  Foot Exam: no exam today.  Taking aspirin: no.  Lipids: LDL 85 in May 2023. Pt taking Lipitor.  Insulin: OmniPod5 insulin pump.  Testing: DexcomG6 sensor.  Hypoglycemia tx: has Baqsimi.                    ROS  Please see under HPI.    Allergies  Allergies   Allergen Reactions    Nsaids Angioedema, Hives and Swelling     Eyes swelling      Shellfish Allergy Swelling       Medications  Current Outpatient Medications   Medication Sig  Dispense Refill    atorvastatin (LIPITOR) 10 MG tablet Take 1 tablet (10 mg) by mouth daily at 2 pm 90 tablet 3    Continuous Blood Gluc Sensor (DEXCOM G6 SENSOR) MISC Change every 10 days. 9 each 3    Continuous Blood Gluc Transmit (DEXCOM G6 TRANSMITTER) MISC Change every 3 months. 1 each 4    fosinopril (MONOPRIL) 20 MG tablet Take 1 tablet (20 mg) by mouth daily 90 tablet 3    Glucagon (BAQSIMI) 3 MG/DOSE nasal powder Spray 1 spray (3 mg) in nostril as needed (To treat severe hypoglycemia) in the event of unconscious hypoglycemia or hypoglycemic seizure. May repeat dose if no response after 15 minutes. 2 each 1    HUMALOG 100 UNIT/ML injection FOR USE IN INSULIN PUMP (MAX 70 UNITS PER DAY)      insulin aspart (NOVOLOG VIAL) 100 UNITS/ML vial FOR USE IN INSULIN PUMP (MAX 70 UNITS PER DAY) 70 mL 3    Insulin Disposable Pump (OMNIPOD 5 G6 INTRO, GEN 5,) KIT 1 each every 72 hours 1 kit 0    Insulin Disposable Pump (OMNIPOD 5 PODS, GEN 5,) MISC 1 each every 72 hours. 45 each 3    KETOSTIX test strip Use as daily as needed 50 strip 1       Family History  No family hx of diabetes.    Social History   reports that he has never smoked. He has never been exposed to tobacco smoke. He has never used smokeless tobacco.     Past Medical History  Type 1 DM dx in 2012.    Physical Exam    No exam today.    RESULTS  Creatinine   Date Value Ref Range Status   05/03/2023 0.96 0.67 - 1.17 mg/dL Final     GFR Estimate   Date Value Ref Range Status   05/03/2023 >90 >60 mL/min/1.73m2 Final     Comment:     eGFR calculated using 2021 CKD-EPI equation.     Hemoglobin A1C   Date Value Ref Range Status   05/03/2023 7.7 (H) 0.0 - 5.6 % Final     Comment:     Normal <5.7%   Prediabetes 5.7-6.4%    Diabetes 6.5% or higher     Note: Adopted from ADA consensus guidelines.     ALT   Date Value Ref Range Status   05/03/2023 65 (H) 10 - 50 U/L Final     AST   Date Value Ref Range Status   05/03/2023 28 10 - 50 U/L Final     TSH   Date Value Ref  Range Status   05/03/2023 2.35 0.30 - 4.20 uIU/mL Final       Cholesterol   Date Value Ref Range Status   05/03/2023 150 <200 mg/dL Final     Direct Measure HDL   Date Value Ref Range Status   05/03/2023 57 >=40 mg/dL Final     LDL Cholesterol Calculated   Date Value Ref Range Status   05/03/2023 85 <=100 mg/dL Final     Triglycerides   Date Value Ref Range Status   05/03/2023 40 <150 mg/dL Final         ASSESSMENT/PLAN:      TYPE 1 DIABETES MELLITUS: Type 1 DM with no microvascular complications. Pt's blood sugars are stable at this time.  Continue current insulin pump settings.  I have requested that his OmniPod5 insulin pump supplies to be filled today. No hx of diabetic retinopathy.  Patient seen by ophthalmology here in February 2024 with no evidence of diabetic retinopathy.   Patient's urine microalbuminuria was negative in May 2023. Pt is taking Monopril.  Creat/GFR normal in May 2023. No sx of diabetic neuropathy or foot ulcers/sores.  No vitals today.    FOLLOW UP : With Dr. Rico in 6 months.  A1C and annual fasting diabetes labs ordered today.  Requested that patient's OmniPod5 insulin pump supplies be ordered today.    Time spent reviewing chart,labs,OmniPod5 insulin pump and DexcomG6 sensor data today = 5 minutes.  Time for video visit today = 18 minutes.  Time for documentation today = 10 minutes.      Total time for visit today = 33 minutes.    Kendal Briggs PA-C

## 2024-11-06 NOTE — NURSING NOTE
Pt states type 2 diabetes in chart, but should be changed type 1    Current patient location: [address] [place] [id number]    Is the patient currently in the state of [place]? YES    Visit mode:VIDEO    If the visit is dropped, the patient can be reconnected by: VIDEO VISIT: Text to cell phone:   Telephone Information:   Mobile [phone]       Will anyone else be joining the visit? NO  (If patient encounters technical issues they should call [phone])    Are changes needed to the allergy or medication list? No, confirmed at e-check in    Are refills needed on medications prescribed by this physician? Discuss with provider    Rooming Documentation:  Not applicable    Reason for visit: RECHECK ( 3 mo follow up)    Fiorella KRUGER

## 2024-11-06 NOTE — LETTER
11/6/2024       RE: Atul Walker  [address]  [place] [address]     Dear Colleague,    Thank you for referring your patient, Atul Walker, to the [facility] ENDOCRINOLOGY CLINIC [place] [facility]. Please see a copy of my visit note below.    Outcome for 10/15/24 1:20 PM: Data uploaded on Dexcom and Solar Notion  Tova Gonzalez MA  Outcome for 11/04/24 10:52 AM: Data obtained via Dexcom and Glooko website  Tova Gonzalez MA                        Virtual Visit Details    Type of service:  Video Visit     Originating Location (pt. Location):     Distant Location (provider location):    Platform used for Video Visit:       Time of start: 7:55 am   Time of end: 8:13 am   Total duration of video visit: 18 minutes.  Providers location: offsite.  Patients location: [place].    [facility]  Atul Walker is a 39 year old male with type 1 diabetes mellitus.  Pt last seen by Dr. Rico in July 2024.   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  No hx of diabetic retinopathy, nephropathy or neuropathy.  For his diabetes, he is using an OmniPod5 insulin pump and DexcomG6 sensor.  Basal rates are below:  Midnight = 1.5 units/hr  6 am = 1.4 units/hr  3:30 pm = 1.3 units/hr  8 pm = 1.5 units/hr.  I/C ratio :  Midnight 1:14  6 am 1:10  9 pm 1:11  5 pm  1:10  CF is 40 at midnight and 38 at 5 PM.  Most recent A1C was  7.7 % on 5/3/2023.  I reviewed and scanned his OmniPod5 insulin pump and DexcomG6 sensor download data in his note below.  Average glucose 155 with SD 47.  Glucose in target range 72% of the time with no frequent hypoglycemia.  Pt has been having a difficult time getting his OmniPod5 supplies refilled.  I have requested that this be taken care of today.  On ROS today, denies blurred vision, n/v, SOB at rest, cough or fever.  No chest pain, abd pain, diarrhea,  dysuria or hematuria.  Pt denies sx of neuropathy or foot ulcers.    Diabetes Care  Retinopathy: none; he was seen by ophthalmology here in February 2020 diabetic retinopathy.    Nephropathy: none; urine microalbuminuria negative in May 2023.  Pt is taking Monopril.  Neuropathy: none.  Foot Exam: no exam today.  Taking aspirin: no.  Lipids: LDL 85 in May 2023. Pt taking Lipitor.  Insulin: OmniPod5 insulin pump.  Testing: DexcomG6 sensor.  Hypoglycemia tx: has Baqsimi.                    ROS  Please see under HPI.    Allergies  Allergies   Allergen Reactions     Nsaids Angioedema, Hives and Swelling     Eyes swelling       Shellfish Allergy Swelling       Medications  Current Outpatient Medications   Medication Sig Dispense Refill     atorvastatin (LIPITOR) 10 MG tablet Take 1 tablet (10 mg) by mouth daily at 2 pm 90 tablet 3     Continuous Blood Gluc Sensor (DEXCOM G6 SENSOR) MISC Change every 10 days. 9 each 3     Continuous Blood Gluc Transmit (DEXCOM G6 TRANSMITTER) MISC Change every 3 months. 1 each 4     fosinopril (MONOPRIL) 20 MG tablet Take 1 tablet (20 mg) by mouth daily 90 tablet 3     Glucagon (BAQSIMI) 3 MG/DOSE nasal powder Spray 1 spray (3 mg) in nostril as needed (To treat severe hypoglycemia) in the event of unconscious hypoglycemia or hypoglycemic seizure. May repeat dose if no response after 15 minutes. 2 each 1     HUMALOG 100 UNIT/ML injection FOR USE IN INSULIN PUMP (MAX 70 UNITS PER DAY)       insulin aspart (NOVOLOG VIAL) 100 UNITS/ML vial FOR USE IN INSULIN PUMP (MAX 70 UNITS PER DAY) 70 mL 3     Insulin Disposable Pump (OMNIPOD 5 G6 INTRO, GEN 5,) KIT 1 each every 72 hours 1 kit 0     Insulin Disposable Pump (OMNIPOD 5 PODS, GEN 5,) MISC 1 each every 72 hours. 45 each 3     KETOSTIX test strip Use as daily as needed 50 strip 1       Family History  No family hx of diabetes.    Social History   reports that he has never smoked. He has never been exposed to tobacco smoke. He has never used  smokeless tobacco.     Past Medical History  Type 1 DM dx in 2012.    Physical Exam    No exam today.    RESULTS  Creatinine   Date Value Ref Range Status   05/03/2023 0.96 0.67 - 1.17 mg/dL Final     GFR Estimate   Date Value Ref Range Status   05/03/2023 >90 >60 mL/min/1.73m2 Final     Comment:     eGFR calculated using 2021 CKD-EPI equation.     Hemoglobin A1C   Date Value Ref Range Status   05/03/2023 7.7 (H) 0.0 - 5.6 % Final     Comment:     Normal <5.7%   Prediabetes 5.7-6.4%    Diabetes 6.5% or higher     Note: Adopted from ADA consensus guidelines.     ALT   Date Value Ref Range Status   05/03/2023 65 (H) 10 - 50 U/L Final     AST   Date Value Ref Range Status   05/03/2023 28 10 - 50 U/L Final     TSH   Date Value Ref Range Status   05/03/2023 2.35 0.30 - 4.20 uIU/mL Final       Cholesterol   Date Value Ref Range Status   05/03/2023 150 <200 mg/dL Final     Direct Measure HDL   Date Value Ref Range Status   05/03/2023 57 >=40 mg/dL Final     LDL Cholesterol Calculated   Date Value Ref Range Status   05/03/2023 85 <=100 mg/dL Final     Triglycerides   Date Value Ref Range Status   05/03/2023 40 <150 mg/dL Final         ASSESSMENT/PLAN:      TYPE 1 DIABETES MELLITUS: Type 1 DM with no microvascular complications. Pt's blood sugars are stable at this time.  Continue current insulin pump settings.  I have requested that his OmniPod5 insulin pump supplies to be filled today. No hx of diabetic retinopathy.  Patient seen by ophthalmology here in February 2024 with no evidence of diabetic retinopathy.   Patient's urine microalbuminuria was negative in May 2023. Pt is taking Monopril.  Creat/GFR normal in May 2023. No sx of diabetic neuropathy or foot ulcers/sores.  No vitals today.    FOLLOW UP : With Dr. Rico in 6 months.  A1C and annual fasting diabetes labs ordered today.  Requested that patient's OmniPod5 insulin pump supplies be ordered today.    Time spent reviewing chart,labs,OmniPod5 insulin pump and  DexcomG6 sensor data today = 5 minutes.  Time for video visit today = 18 minutes.  Time for documentation today = 10 minutes.      Total time for visit today = 33 minutes.    Kendal Briggs PA-C        Again, thank you for allowing me to participate in the care of your patient.      Sincerely,    Kendal Briggs PA-C

## 2024-11-06 NOTE — TELEPHONE ENCOUNTER
Prior Authorization Specialty Medication Request    Medication/Dose:   Insulin Disposable Pump (OMNIPOD 5 PODS, GEN 5,) MISC 45 each 3 10/31/2024 -- No   Sig - Route: 1 each every 72 hours. -      Diagnosis and ICD code (if different than what is on RX):    New/renewal/insurance change PA/secondary ins. PA:  Previously Tried and Failed:      Important Lab Values:   Rationale:     Insurance   Primary:   Insurance ID:      Secondary (if applicable):  Insurance ID:      Pharmacy Information (if different than what is on RX)  Name:    Phone:    Fax:    Clinic Information  Preferred routing pool for dept communication:

## 2024-11-06 NOTE — PATIENT INSTRUCTIONS
As part of your comprehensive care, a primary care provider is needed for all of our patients. Your endocrine specialist is unable to act as your primary care provider. Should you wish to establish care with our [facility] team here at the [facility] and surgery center, please call  to schedule your visit. If you prefer to utilize primary care elsewhere, your insurance provider will have a list of covered providers in your area. Please establish care with the provider of your choice. You do not need a referral for primary care.

## 2024-11-06 NOTE — TELEPHONE ENCOUNTER
----- Message from Urvashi MAYES sent at 11/6/2024 11:01 AM CST -----  PA request has been sent to PA team.  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  Please be sure his OmniPod5 pods/supplies are renewed asap and complete PA if needed.  He only has 2 pods left.  He uses [facility] in [place] listed in his chart.  Thanks,  Dafne

## 2024-11-07 NOTE — TELEPHONE ENCOUNTER
Central Prior Authorization Team  Phone: [phone]    PA Initiation    Medication: OMNIPOD 5 [id number] PODS GEN 5 MISC  Insurance Company: [employer] - Phone [phone] Fax [phone]  Pharmacy Filling the Rx: [id number] - [place], [place] - [address]  Filling Pharmacy Phone: [phone]  Filling Pharmacy Fax: [phone]  Start Date: 11/7/2024

## 2024-11-07 NOTE — TELEPHONE ENCOUNTER
Prior Authorization Approval    Medication: OMNIPOD 5 [id number] PODS GEN 5 MISC  Authorization Effective Date: 10/8/2024  Authorization Expiration Date: 11/7/2025  Approved Dose/Quantity:   Reference #:     Insurance Company: [employer] [phone] Fax [phone]  Expected CoPay: $    CoPay Card Available:      Financial Assistance Needed:   Which Pharmacy is filling the prescription: [id number] - [place], [place] - [address]  Pharmacy Notified: Yes    Patient Notified: No

## 2024-11-09 ENCOUNTER — LAB (OUTPATIENT)
Dept: LAB | Facility: CLINIC | Age: 39
End: 2024-11-09
Payer: COMMERCIAL

## 2024-11-09 DIAGNOSIS — E10.9 TYPE 1 DIABETES MELLITUS WITHOUT COMPLICATION (H): ICD-10-CM

## 2024-11-09 LAB
ALT SERPL W P-5'-P-CCNC: 38 U/L (ref 0–70)
AST SERPL W P-5'-P-CCNC: 21 U/L (ref 0–45)
CHOLEST SERPL-MCNC: 189 MG/DL
CREAT SERPL-MCNC: 0.94 MG/DL (ref 0.67–1.17)
CREAT UR-MCNC: 143 MG/DL
EGFRCR SERPLBLD CKD-EPI 2021: >90 ML/MIN/1.73M2
EST. AVERAGE GLUCOSE BLD GHB EST-MCNC: 157 MG/DL
FASTING STATUS PATIENT QL REPORTED: YES
HBA1C MFR BLD: 7.1 % (ref 0–5.6)
HDLC SERPL-MCNC: 53 MG/DL
LDLC SERPL CALC-MCNC: 128 MG/DL
MICROALBUMIN UR-MCNC: <12 MG/L
MICROALBUMIN/CREAT UR: NORMAL MG/G{CREAT}
NONHDLC SERPL-MCNC: 136 MG/DL
TRIGL SERPL-MCNC: 39 MG/DL
TSH SERPL DL<=0.005 MIU/L-ACNC: 1.9 UIU/ML (ref 0.3–4.2)

## 2024-11-09 PROCEDURE — 84460 ALANINE AMINO (ALT) (SGPT): CPT

## 2024-11-09 PROCEDURE — 84450 TRANSFERASE (AST) (SGOT): CPT

## 2024-11-09 PROCEDURE — 83036 HEMOGLOBIN GLYCOSYLATED A1C: CPT

## 2024-11-09 PROCEDURE — 84443 ASSAY THYROID STIM HORMONE: CPT

## 2024-11-09 PROCEDURE — 80061 LIPID PANEL: CPT

## 2024-11-09 PROCEDURE — 36415 COLL VENOUS BLD VENIPUNCTURE: CPT

## 2024-11-09 PROCEDURE — 82570 ASSAY OF URINE CREATININE: CPT

## 2024-11-09 PROCEDURE — 82565 ASSAY OF CREATININE: CPT

## 2024-11-09 PROCEDURE — 82043 UR ALBUMIN QUANTITATIVE: CPT

## 2025-01-09 DIAGNOSIS — E10.9 TYPE 1 DIABETES MELLITUS WITHOUT COMPLICATION (H): ICD-10-CM

## 2025-01-09 RX ORDER — ACYCLOVIR 400 MG/1
TABLET ORAL
Qty: 3 EACH | Refills: 5 | OUTPATIENT
Start: 2025-01-09

## 2025-01-09 RX ORDER — INSULIN PMP CART,AUT,G6/7,CNTR
1 EACH SUBCUTANEOUS
Qty: 45 EACH | Refills: 3 | Status: SHIPPED | OUTPATIENT
Start: 2025-01-09

## 2025-02-16 ENCOUNTER — HEALTH MAINTENANCE LETTER (OUTPATIENT)
Age: 40
End: 2025-02-16

## 2025-03-16 ENCOUNTER — HEALTH MAINTENANCE LETTER (OUTPATIENT)
Age: 40
End: 2025-03-16

## 2025-04-08 ENCOUNTER — MYC REFILL (OUTPATIENT)
Dept: ENDOCRINOLOGY | Facility: CLINIC | Age: 40
End: 2025-04-08
Payer: COMMERCIAL

## 2025-04-08 DIAGNOSIS — E10.9 TYPE 1 DIABETES MELLITUS WITHOUT COMPLICATION (H): ICD-10-CM

## 2025-04-08 RX ORDER — INSULIN ASPART 100 [IU]/ML
INJECTION, SOLUTION INTRAVENOUS; SUBCUTANEOUS
Qty: 70 ML | Refills: 3 | Status: SHIPPED | OUTPATIENT
Start: 2025-04-08 | End: 2025-04-09

## 2025-04-09 ENCOUNTER — MYC MEDICAL ADVICE (OUTPATIENT)
Dept: ENDOCRINOLOGY | Facility: CLINIC | Age: 40
End: 2025-04-09
Payer: COMMERCIAL

## 2025-04-09 DIAGNOSIS — E10.9 TYPE 1 DIABETES MELLITUS WITHOUT COMPLICATION (H): ICD-10-CM

## 2025-04-09 RX ORDER — INSULIN ASPART 100 [IU]/ML
INJECTION, SOLUTION INTRAVENOUS; SUBCUTANEOUS
Qty: 70 ML | Refills: 3 | Status: SHIPPED | OUTPATIENT
Start: 2025-04-09 | End: 2025-04-09

## 2025-04-09 RX ORDER — INSULIN ASPART 100 [IU]/ML
INJECTION, SOLUTION INTRAVENOUS; SUBCUTANEOUS
Qty: 70 ML | Refills: 3 | Status: SHIPPED | OUTPATIENT
Start: 2025-04-09

## 2025-04-10 RX ORDER — FOSINOPRIL SODIUM 20 MG/1
20 TABLET ORAL DAILY
Qty: 90 TABLET | Refills: 1 | Status: SHIPPED | OUTPATIENT
Start: 2025-04-10

## 2025-04-10 RX ORDER — ATORVASTATIN CALCIUM 10 MG/1
10 TABLET, FILM COATED ORAL
Qty: 90 TABLET | Refills: 4 | Status: SHIPPED | OUTPATIENT
Start: 2025-04-10

## 2025-04-10 NOTE — TELEPHONE ENCOUNTER
Last Written Prescription:  fosinopril (MONOPRIL) 20 MG tablet   90 tablet 3 5/31/2024     ----------------------  Last Visit Date: 11-6-2024  Future Visit Date: 5-  ----------------------  Refill decision: Medication unable to be refilled by RN due to:   Other:    overdue for vitals, labs,     Request from pharmacy:  Requested Prescriptions   Pending Prescriptions Disp Refills    fosinopril (MONOPRIL) 20 MG tablet 90 tablet 3     Sig: Take 1 tablet (20 mg) by mouth daily.       ACE Inhibitors (Including Combos) Protocol Failed - 4/10/2025  7:35 AM        Failed - Most recent blood pressure under 140/90 in past 12 months- Clinicial or Patient Reported     BP Readings from Last 3 Encounters:   No data found for BP     No  vitals noted in chart          Failed - Normal serum potassium on file in past 12 months     No lab results found.  Labs completed on :3-2-2022  Potassium  3.3 - 5.1 MMOL/L 4.4     Creatinine   Date Value Ref Range Status   11/09/2024 0.94 0.67 - 1.17 mg/dL Final           Passed - Medication is active on med list and the sig matches. RN to manually verify dose and sig if red X/fail.             Passed - Medication indicated for associated diagnosis     Medication is associated with one or more of the following diagnoses:             Passed - Recent (12 mo) or future (90 days) visit within the authorizing provider's specialty     The patient must have completed an in-person or virtual visit within the past 12 months or has a future visit scheduled within the next 90 days with the authorizing provider s specialty.  Urgent care and e-visits do not qualify as an office visit for this protocol.          Passed - Most recent GFR on file in the past 12 months >30        Passed - Patient is age 18 or older          atorvastatin (LIPITOR) 10 MG tablet   90 tablet 3 5/31/2024   Last Visit Date: 11-6-2024  Future Visit Date: 5-      atorvastatin (LIPITOR) 10 MG tablet 90 tablet 3     Sig: Take 1  tablet (10 mg) by mouth daily at 2 pm.       Antihyperlipidemic agents Passed - 4/10/2025  7:35 AM        Passed - LDL on file in the past 12 months   LDL Cholesterol Calculated   Date Value Ref Range Status   11/09/2024 128 (H) <100 mg/dL Final     Lab Results   Component Value Date    ALT 38 11/09/2024         Passed - Medication is active on med list and the sig matches. RN to manually verify dose and sig if red X/fail.             Passed - Recent (12 mo) or future (90 days) visit within the authorizing provider's specialty     The patient must have completed an in-person or virtual visit within the past 12 months or has a future visit scheduled within the next 90 days with the authorizing provider s specialty.  Urgent care and e-visits do not qualify as an office visit for this protocol.          Passed - Patient is age 18 years or older

## 2025-05-18 ENCOUNTER — HEALTH MAINTENANCE LETTER (OUTPATIENT)
Age: 40
End: 2025-05-18

## 2025-05-21 ENCOUNTER — VIRTUAL VISIT (OUTPATIENT)
Dept: ENDOCRINOLOGY | Facility: CLINIC | Age: 40
End: 2025-05-21
Payer: COMMERCIAL

## 2025-05-21 DIAGNOSIS — E10.9 TYPE 1 DIABETES MELLITUS WITHOUT COMPLICATION (H): Primary | ICD-10-CM

## 2025-05-21 PROCEDURE — 1126F AMNT PAIN NOTED NONE PRSNT: CPT | Mod: 95 | Performed by: STUDENT IN AN ORGANIZED HEALTH CARE EDUCATION/TRAINING PROGRAM

## 2025-05-21 PROCEDURE — 95251 CONT GLUC MNTR ANALYSIS I&R: CPT | Performed by: STUDENT IN AN ORGANIZED HEALTH CARE EDUCATION/TRAINING PROGRAM

## 2025-05-21 PROCEDURE — 98006 SYNCH AUDIO-VIDEO EST MOD 30: CPT | Mod: 25 | Performed by: STUDENT IN AN ORGANIZED HEALTH CARE EDUCATION/TRAINING PROGRAM

## 2025-05-21 NOTE — NURSING NOTE
Current patient location: [address] [place] [id number]    Is the patient currently in the state of [place]? YES    Visit mode: VIDEO    If the visit is dropped, the patient can be reconnected by:VIDEO VISIT: Text to cell phone:   Telephone Information:   Mobile [phone]       Will anyone else be joining the visit? NO  (If patient encounters technical issues they should call [phone])    Are changes needed to the allergy or medication list? No and Pt stated no med changes    Are refills needed on medications prescribed by this physician? NO    Rooming Documentation:  Not applicable    Reason for visit: IESHA KRUGER

## 2025-05-21 NOTE — LETTER
5/21/2025      Atul Walker  [address] [address]      Dear Colleague,    Thank you for referring your patient, Atul Walker, to the [facility]. Please see a copy of my visit note below.        Endocrinology Clinic Visit       Video-Visit Details    Type of service:  Video Visit    Joined the call at 5/21/2025, 12:37:16 pm.  Left the call at 5/21/2025, 12:56:37 pm.    Originating Location (pt. Location): Home        Distant Location (provider location):  Off-site    Mode of Communication:  Video Conference via [employer]    Physician has received verbal consent for a Video Visit from the patient? Yes    30 minutes spent on the date of the encounter doing chart review, history and exam, documentation and further activities as noted above.  This time excludes time spent reviewing CGM.       NAME:  Atul Lowryeida  PCP:  No primary care provider on file.  MRN:  [id number]  Reason for Consult:  DM1  Requesting Provider:  Referred Self    Chief Complaint     Chief Complaint   Patient presents with     RECHECK       History of Present Illness     Atul Fernandez is a 37 year old male who is seen in video visit for type 1 diabetes. He established with me on 2/2023. Las seen 7/2024. 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 A1c 13 at time of diagnosis. Positive antibodies per patient. 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 He started to use a closed looped pump (Minimed Medtronic 670G) in August 2017. then 770G pump.     Rarely misses covering meals. Covers meals before eating, more challenging during work hours. He met with keisha Steve on 3/2023. he switched to omnipod summer 2023. He is happy with his new pump.   He continues to exercise; High intensity classes ( 45 min)  3-4 times a week after work.   He pauses his pump  during the intense part and  resume it when stretching. BG goes up for short time after exercise then correct to normal within an hour. Exercise mode did not work well for him and he was having ongoing low bg.           Previous A1C in records reviewed.   Lab Results   Component Value Date    A1C 7.1 (H) 11/09/2024    A1C 7.7 (H) 05/03/2023         Diabetes Care/Complications:   Eyes: No DR. Last eye exam 2/2024  Kidneys: No, last urine microalb negative on 11/2024  Nerves: No symptoms of neuropathy  Smoking: No  Blood Pressure: reportedly controlled  Lipids: Yes, on statin, last  on 11/2024, he was missing his statin frequently before his labs.  Macrovascular: No  Hypoglycemia:no concern    Previous DM related Hospitalization: No    Current treatment strategy:   Lantus 15u qPM if pump fails- has not had to use  Novolog via insulin pump    Blood Glucose Monitoring:                               Diet:   Eats breakfast, lunch, and dinner.    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    Problem List     DM1    Medications     Current Outpatient Medications   Medication Sig Dispense Refill     atorvastatin (LIPITOR) 10 MG tablet Take 1 tablet (10 mg) by mouth daily at 2 pm. 90 tablet 4     Continuous Blood Gluc Sensor (DEXCOM G6 SENSOR) MISC Change every 10 days. 9 each 3     Continuous Blood Gluc Transmit (DEXCOM G6 TRANSMITTER) MISC Change every 3 months. 1 each 4     Continuous Glucose Sensor (DEXCOM G7 SENSOR) MISC Change every 10 days. 3 each 5     fosinopril (MONOPRIL) 20 MG tablet Take 1 tablet (20 mg) by mouth daily. 90 tablet 1     Glucagon (BAQSIMI) 3 MG/DOSE nasal powder Spray 1 spray (3 mg) in nostril as needed (To treat severe hypoglycemia) in the event of unconscious hypoglycemia or hypoglycemic seizure. May repeat dose if no response after 15 minutes. 2 each 1     HUMALOG 100 UNIT/ML injection FOR USE IN INSULIN PUMP (MAX 70 UNITS PER DAY)       Insulin Disposable Pump  (OMNIPOD 5 G6 INTRO, GEN 5,) KIT 1 each every 72 hours 1 kit 0     Insulin Disposable Pump (OMNIPOD 5 PODS, GEN 5,) MISC 1 each every 72 hours. 45 each 3     KETOSTIX test strip Use as daily as needed 50 strip 1     NOVOLOG VIAL 100 UNIT/ML soln Via insulin pump. Approx 70 units daily. 70 mL 3     No current facility-administered medications for this visit.        Allergies     Allergies   Allergen Reactions     Nsaids Angioedema, Hives and Swelling     Eyes swelling       Shellfish Allergy Swelling       Medical / Surgical History     DM1    Social History     Social History     Socioeconomic History     Marital status:      Spouse name: Not on file     Number of children: Not on file     Years of education: Not on file     Highest education level: Not on file   Occupational History     Not on file   Tobacco Use     Smoking status: Never     Passive exposure: Never     Smokeless tobacco: Never   Vaping Use     Vaping status: Never Used   Substance and Sexual Activity     Alcohol use: Not on file     Drug use: Not on file     Sexual activity: Not on file   Other Topics Concern     Not on file   Social History Narrative     Not on file     Social Drivers of Health     Financial Resource Strain: Not on file   Food Insecurity: Not on file   Transportation Needs: Not on file   Physical Activity: Not on file   Stress: Not on file   Social Connections: Not on file   Interpersonal Safety: Not on file   Housing Stability: Not on file     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 Has 2 children, ages 5 and 7.  Smoking: No  Alcohol: No    Family History     Family History   Problem Relation Age of Onset     Thyroid Disease Mother      Coronary Artery Disease Father      Hypertension Father      Hyperlipidemia Father       No family history of diabetes or autoimmune disease.  Dad's side: HTN, cardiac disease  Mom's side: Mom with hypothyroidism    ROS     Limited  ROS completed,  pertinent positive and negative in HPI    Physical Exam   There were no vitals taken for this visit.   GENERAL: Healthy, alert and no distress  EYES: Eyes grossly normal to inspection.  No discharge or erythema, or obvious scleral/conjunctival abnormalities.  RESP: No audible wheeze, cough, or visible cyanosis.  No visible retractions or increased work of breathing.    SKIN: Visible skin clear. No significant rash, abnormal pigmentation or lesions.  NEURO: Cranial nerves grossly intact.  Mentation and speech appropriate for age.  PSYCH: Mentation appears normal, affect normal/bright, judgement and insight intact, normal speech and appearance well-groomed.     Labs/Imaging     Pertinent Labs were reviewed and discussed briefly.  Radiology Results were  reviewed.       I personally reviewed the patient's outside records from Care Everywhere. Summary of pertinent findings in [facility].    Impression / Plan     1. 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 Good control. We discussed using temp basal during time of exercise. We reviewed his downloaded data today, he has a trend of post prandial hyperglycemia especially 3-6 pm, during work days related to different food intake and timing of coverage. For now he prefers not to make any pump setting changes which is reasonable.      2. Diabetes Complications: None.  Due for labs, eye exam and foot exam.     3. Blood Pressure Management: Blood pressure previously reported 120-130s/70-80s at home. Currently is on pharmacotherapy for this.     4.Lipid Management: Per the new ACC/MAYRA/NHLBI guidelines, statins are recommended for individuals with diabetes aged 40-75 with LDL  without ASCVD, and for any individual with ASCVD. Currently the patient is on a statin. LDL above goal suspect due to poor complaince to statin. Plan to recheck.       5. Smoking Status: Patient is smoke free.    Review of the result(s) of each unique test - A1c and  diabetes related labs.         Test and/or medications prescribed today:  Orders Placed This Encounter   Procedures     Continuous Glucose Monitoring >=72 hours PHYS INTERP     Hemoglobin A1c     Lipid panel reflex to direct LDL Fasting         Follow up: 6 month     The longitudinal plan of care for the diagnosis(es)/condition(s) as documented were addressed during this visit. Due to the added complexity in care, I will continue to support Atul in the subsequent management and with ongoing continuity of care.       Juanpablo Rico MD  Endocrinology, Diabetes and Metabolism  [facility]       Again, thank you for allowing me to participate in the care of your patient.        Sincerely,        Juanpablo Rico MD    Electronically signed

## 2025-05-21 NOTE — PROGRESS NOTES
Endocrinology Clinic Visit       Video-Visit Details    Type of service:  Video Visit    Joined the call at 5/21/2025, 12:37:16 pm.  Left the call at 5/21/2025, 12:56:37 pm.    Originating Location (pt. Location): Home        Distant Location (provider location):  Off-site    Mode of Communication:  Video Conference via [employer]    Physician has received verbal consent for a Video Visit from the patient? Yes    30 minutes spent on the date of the encounter doing chart review, history and exam, documentation and further activities as noted above.  This time excludes time spent reviewing CGM.       NAME:  Atul Leeroy isadora Menchaca  PCP:  No primary care provider on file.  MRN:  [id number]  Reason for Consult:  DM1  Requesting Provider:  Referred Self    Chief Complaint     Chief Complaint   Patient presents with    RECHECK       History of Present Illness     Atul Umaña isadora Menchaca is a 37 year old male who is seen in video visit for type 1 diabetes. He established with me on 2/2023. Las seen 7/2024. 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 A1c 13 at time of diagnosis. Positive antibodies per patient. 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 He started to use a closed looped pump (Minimed Medtronic 670G) in August 2017. then 770G pump.     Rarely misses covering meals. Covers meals before eating, more challenging during work hours. He met with keisha Steve on 3/2023. he switched to omnipod summer 2023. He is happy with his new pump.   He continues to exercise; High intensity classes ( 45 min)  3-4 times a week after work.   He pauses his pump  during the intense part and resume it when stretching. BG goes up for short time after exercise then correct to normal within an hour. Exercise mode did not work well for him and he was having ongoing low bg.           Previous A1C in records reviewed.   Lab Results   Component Value  Date    A1C 7.1 (H) 11/09/2024    A1C 7.7 (H) 05/03/2023         Diabetes Care/Complications:   Eyes: No DRMandy Last eye exam 2/2024  Kidneys: No, last urine microalb negative on 11/2024  Nerves: No symptoms of neuropathy  Smoking: No  Blood Pressure: reportedly controlled  Lipids: Yes, on statin, last  on 11/2024, he was missing his statin frequently before his labs.  Macrovascular: No  Hypoglycemia:no concern    Previous DM related Hospitalization: No    Current treatment strategy:   Lantus 15u qPM if pump fails- has not had to use  Novolog via insulin pump    Blood Glucose Monitoring:                               Diet:   Eats breakfast, lunch, and dinner.    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    Problem List     DM1    Medications     Current Outpatient Medications   Medication Sig Dispense Refill    atorvastatin (LIPITOR) 10 MG tablet Take 1 tablet (10 mg) by mouth daily at 2 pm. 90 tablet 4    Continuous Blood Gluc Sensor (DEXCOM G6 SENSOR) MISC Change every 10 days. 9 each 3    Continuous Blood Gluc Transmit (DEXCOM G6 TRANSMITTER) MISC Change every 3 months. 1 each 4    Continuous Glucose Sensor (DEXCOM G7 SENSOR) MISC Change every 10 days. 3 each 5    fosinopril (MONOPRIL) 20 MG tablet Take 1 tablet (20 mg) by mouth daily. 90 tablet 1    Glucagon (BAQSIMI) 3 MG/DOSE nasal powder Spray 1 spray (3 mg) in nostril as needed (To treat severe hypoglycemia) in the event of unconscious hypoglycemia or hypoglycemic seizure. May repeat dose if no response after 15 minutes. 2 each 1    HUMALOG 100 UNIT/ML injection FOR USE IN INSULIN PUMP (MAX 70 UNITS PER DAY)      Insulin Disposable Pump (OMNIPOD 5 G6 INTRO, GEN 5,) KIT 1 each every 72 hours 1 kit 0    Insulin Disposable Pump (OMNIPOD 5 PODS, GEN 5,) MISC 1 each every 72 hours. 45 each 3    KETOSTIX test strip Use as daily as needed 50 strip 1    NOVOLOG VIAL 100 UNIT/ML soln Via insulin pump.  Approx 70 units daily. 70 mL 3     No current facility-administered medications for this visit.        Allergies     Allergies   Allergen Reactions    Nsaids Angioedema, Hives and Swelling     Eyes swelling      Shellfish Allergy Swelling       Medical / Surgical History     DM1    Social History     Social History     Socioeconomic History    Marital status:      Spouse name: Not on file    Number of children: Not on file    Years of education: Not on file    Highest education level: Not on file   Occupational History    Not on file   Tobacco Use    Smoking status: Never     Passive exposure: Never    Smokeless tobacco: Never   Vaping Use    Vaping status: Never Used   Substance and Sexual Activity    Alcohol use: Not on file    Drug use: Not on file    Sexual activity: Not on file   Other Topics Concern    Not on file   Social History Narrative    Not on file     Social Drivers of Health     Financial Resource Strain: Not on file   Food Insecurity: Not on file   Transportation Needs: Not on file   Physical Activity: Not on file   Stress: Not on file   Social Connections: Not on file   Interpersonal Safety: Not on file   Housing Stability: Not on file     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 Has 2 children, ages 5 and 7.  Smoking: No  Alcohol: No    Family History     Family History   Problem Relation Age of Onset    Thyroid Disease Mother     Coronary Artery Disease Father     Hypertension Father     Hyperlipidemia Father       No family history of diabetes or autoimmune disease.  Dad's side: HTN, cardiac disease  Mom's side: Mom with hypothyroidism    ROS     Limited  ROS completed, pertinent positive and negative in HPI    Physical Exam   There were no vitals taken for this visit.   GENERAL: Healthy, alert and no distress  EYES: Eyes grossly normal to inspection.  No discharge or erythema, or obvious scleral/conjunctival abnormalities.  RESP: No audible wheeze,  cough, or visible cyanosis.  No visible retractions or increased work of breathing.    SKIN: Visible skin clear. No significant rash, abnormal pigmentation or lesions.  NEURO: Cranial nerves grossly intact.  Mentation and speech appropriate for age.  PSYCH: Mentation appears normal, affect normal/bright, judgement and insight intact, normal speech and appearance well-groomed.     Labs/Imaging     Pertinent Labs were reviewed and discussed briefly.  Radiology Results were  reviewed.       I personally reviewed the patient's outside records from [facility]. Summary of pertinent findings in HPI.    Impression / Plan     1. 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 Good control. We discussed using temp basal during time of exercise. We reviewed his downloaded data today, he has a trend of post prandial hyperglycemia especially 3-6 pm, during work days related to different food intake and timing of coverage. For now he prefers not to make any pump setting changes which is reasonable.      2. Diabetes Complications: None.  Due for labs, eye exam and foot exam.     3. Blood Pressure Management: Blood pressure previously reported 120-130s/70-80s at home. Currently is on pharmacotherapy for this.     4.Lipid Management: Per the new ACC/MAYRA/NHLBI guidelines, statins are recommended for individuals with diabetes aged 40-75 with LDL  without ASCVD, and for any individual with ASCVD. Currently the patient is on a statin. LDL above goal suspect due to poor complaince to statin. Plan to recheck.       5. Smoking Status: Patient is smoke free.    Review of the result(s) of each unique test - A1c and diabetes related labs.         Test and/or medications prescribed today:  Orders Placed This Encounter   Procedures    Continuous Glucose Monitoring >=72 hours PHYS INTERP    Hemoglobin A1c    Lipid panel reflex to direct LDL Fasting         Follow up: 6 month     The longitudinal plan of  care for the diagnosis(es)/condition(s) as documented were addressed during this visit. Due to the added complexity in care, I will continue to support Atul in the subsequent management and with ongoing continuity of care.       Juanpablo Rico MD  Endocrinology, Diabetes and Metabolism  [facility]

## 2025-07-02 DIAGNOSIS — E10.9 TYPE 1 DIABETES MELLITUS WITHOUT COMPLICATION (H): ICD-10-CM

## 2025-07-07 RX ORDER — ACYCLOVIR 400 MG/1
TABLET ORAL
Qty: 9 EACH | Refills: 3 | Status: SHIPPED | OUTPATIENT
Start: 2025-07-07

## 2025-08-31 ENCOUNTER — HEALTH MAINTENANCE LETTER (OUTPATIENT)
Age: 40
End: 2025-08-31